# Patient Record
Sex: FEMALE | Race: BLACK OR AFRICAN AMERICAN | NOT HISPANIC OR LATINO | Employment: FULL TIME | URBAN - METROPOLITAN AREA
[De-identification: names, ages, dates, MRNs, and addresses within clinical notes are randomized per-mention and may not be internally consistent; named-entity substitution may affect disease eponyms.]

---

## 2017-02-14 ENCOUNTER — OFFICE VISIT (OUTPATIENT)
Dept: OBSTETRICS AND GYNECOLOGY | Facility: CLINIC | Age: 47
End: 2017-02-14
Payer: COMMERCIAL

## 2017-02-14 VITALS
DIASTOLIC BLOOD PRESSURE: 90 MMHG | HEIGHT: 63 IN | SYSTOLIC BLOOD PRESSURE: 132 MMHG | BODY MASS INDEX: 36.68 KG/M2 | WEIGHT: 207 LBS

## 2017-02-14 DIAGNOSIS — E03.9 ACQUIRED HYPOTHYROIDISM: ICD-10-CM

## 2017-02-14 DIAGNOSIS — Z12.31 SCREENING MAMMOGRAM, ENCOUNTER FOR: ICD-10-CM

## 2017-02-14 DIAGNOSIS — F32.9 REACTIVE DEPRESSION: ICD-10-CM

## 2017-02-14 DIAGNOSIS — I10 ESSENTIAL HYPERTENSION: ICD-10-CM

## 2017-02-14 DIAGNOSIS — B96.89 BACTERIAL VAGINOSIS: ICD-10-CM

## 2017-02-14 DIAGNOSIS — Z01.419 ENCOUNTER FOR GYNECOLOGICAL EXAMINATION (GENERAL) (ROUTINE) WITHOUT ABNORMAL FINDINGS: Primary | ICD-10-CM

## 2017-02-14 DIAGNOSIS — N76.0 BACTERIAL VAGINOSIS: ICD-10-CM

## 2017-02-14 PROCEDURE — 3080F DIAST BP >= 90 MM HG: CPT | Mod: S$GLB,,, | Performed by: OBSTETRICS & GYNECOLOGY

## 2017-02-14 PROCEDURE — 3075F SYST BP GE 130 - 139MM HG: CPT | Mod: S$GLB,,, | Performed by: OBSTETRICS & GYNECOLOGY

## 2017-02-14 PROCEDURE — 87480 CANDIDA DNA DIR PROBE: CPT

## 2017-02-14 PROCEDURE — 99999 PR PBB SHADOW E&M-NEW PATIENT-LVL III: CPT | Mod: PBBFAC,,, | Performed by: OBSTETRICS & GYNECOLOGY

## 2017-02-14 PROCEDURE — 99386 PREV VISIT NEW AGE 40-64: CPT | Mod: S$GLB,,, | Performed by: OBSTETRICS & GYNECOLOGY

## 2017-02-14 RX ORDER — LOSARTAN POTASSIUM 100 MG/1
100 TABLET ORAL
COMMUNITY
Start: 2016-10-24 | End: 2018-03-07

## 2017-02-14 RX ORDER — LEVOTHYROXINE SODIUM 50 UG/1
50 TABLET ORAL
COMMUNITY
Start: 2016-10-24 | End: 2018-03-07 | Stop reason: CLARIF

## 2017-02-14 RX ORDER — ESCITALOPRAM OXALATE 20 MG/1
20 TABLET ORAL
COMMUNITY
Start: 2016-12-27 | End: 2018-06-13 | Stop reason: ALTCHOICE

## 2017-02-14 RX ORDER — FLUTICASONE PROPIONATE 50 MCG
1 SPRAY, SUSPENSION (ML) NASAL
COMMUNITY
Start: 2017-02-01

## 2017-02-14 RX ORDER — HYDROCHLOROTHIAZIDE 25 MG/1
TABLET ORAL
COMMUNITY
Start: 2016-10-24 | End: 2018-09-13 | Stop reason: DRUGHIGH

## 2017-02-14 NOTE — PROGRESS NOTES
"Subjective:       Patient ID: Janis Brar is a 46 y.o. female.    Chief Complaint:  Annual Exam      History of Present Illness  HPI  Annual Exam-Postmenopausal  Patient presents for annual exam. The patient reports multiple complaints--hot flushes, night sweats, weight gain; decreased libido. The patient is sexually active--also vaginal dryness and minimal sensation in the vagina. GYN screening history: last pap: was normal and patient does not recall when last pap was. The patient is not taking hormone replacement therapy. Used estratest in the past--but stopped secondary to her "cancer concerns".  Patient denies post-menopausal vaginal bleeding. The patient wears seatbelts: yes. The patient participates in regular exercise: yes--walks 2.5 miles/d; gym. Has the patient ever been transfused or tattooed?: no. The patient reports that there is not domestic violence in her life.      Had tvh for menorrhagia/adenomyosis; still has ovaires    Denies leak of urine; has occas urgency  mammo at [facility] in October--had bx on lt breast   Also breast mri in October--has follow up planned in 6 months        GYN & OB History  No LMP recorded. Patient has had a hysterectomy.   Date of Last Pap: No result found    OB History    Para Term  AB SAB TAB Ectopic Multiple Living   4 2 2  2 2    2      # Outcome Date GA Lbr Walter/2nd Weight Sex Delivery Anes PTL Lv   4 Term 96    F Vag-Spont   Y   3 Term 03/15/94    M Vag-Spont   Y   2 SAB            1 SAB                   Review of Systems  Review of Systems   Constitutional: Negative for activity change, appetite change, chills, diaphoresis, fatigue, fever and unexpected weight change.   HENT: Negative for mouth sores and tinnitus.    Eyes: Negative for discharge and visual disturbance.   Respiratory: Negative for cough, shortness of breath and wheezing.    Cardiovascular: Negative for chest pain, palpitations and leg swelling.   Gastrointestinal: " Negative for abdominal pain, bloating, blood in stool, constipation, diarrhea, nausea and vomiting.   Endocrine: Positive for hot flashes and hypothyroidism. Negative for diabetes, hair loss and hyperthyroidism.   Genitourinary: Positive for decreased libido and dyspareunia. Negative for dysuria, flank pain, frequency, genital sores, hematuria, menorrhagia, menstrual problem, pelvic pain, urgency, vaginal bleeding, vaginal discharge, vaginal pain, dysmenorrhea, urinary incontinence, postcoital bleeding, postmenopausal bleeding and vaginal odor.   Musculoskeletal: Negative for back pain and myalgias.   Skin:  Negative for rash, no acne and hair changes.   Neurological: Negative for seizures, syncope, numbness and headaches.   Hematological: Negative for adenopathy. Does not bruise/bleed easily.   Psychiatric/Behavioral: Negative for depression and sleep disturbance. The patient is not nervous/anxious.    Breast: Negative for breast mass, breast pain, nipple discharge and skin changes          Objective:    Physical Exam:   Constitutional: She appears well-developed.     Eyes: Conjunctivae and EOM are normal. Pupils are equal, round, and reactive to light.    Neck: Normal range of motion. Neck supple.     Pulmonary/Chest: Effort normal. Right breast exhibits no mass, no nipple discharge, no skin change and no tenderness. Left breast exhibits no mass, no nipple discharge, no skin change and no tenderness. Breasts are symmetrical.            Abdominal: Soft.     Genitourinary: Rectum normal, vagina normal and uterus normal. Pelvic exam was performed with patient supine. Cervix is normal. Right adnexum displays no mass and no tenderness. Left adnexum displays no mass and no tenderness. No erythema, bleeding, rectocele, cystocele or unspecified prolapse of vaginal walls in the vagina. No vaginal discharge found. Labial bartholins normal.       Uterus Size: 6 cm   Musculoskeletal: Normal range of motion.        Neurological: She is alert.    Skin: Skin is warm.    Psychiatric: She has a normal mood and affect.          Assessment:        1. Encounter for gynecological examination (general) (routine) without abnormal findings    2. Screening mammogram, encounter for    3. Screening for cervical cancer               Plan:      Continue annual well woman exam.  A full discussion of the benefit-risk ratio of hormonal replacement therapy was carried out. Improvement in vasomotor and other climacteric symptoms is discussed, including possible improvements in sleep and mood. Reduction of risk for osteoporosis was explained. We discussed the study data showing increased risk of thrombo-embolic events such as myocardial infarction, stroke Pt was made aware of recent study showing lower breast cancer risk in estrogen only users compared to control group (no ert or hrt) .  We also discussed ACOG's recommendation to use hormone replacement therapy for the relief of hot flashes alone and to be on the lowest dose possible for the shortest amount of time.  Alternative such as herbal and soy-based products were reviewed. All of her questions about this therapy were answered.   wishes to proceed with trial of estratest  Advised also use dim  Will get her previous labs for review  Also advise vaginal estrogen at this point; may be able to stop vaginal estrogen in 2-3 months  Continue diet, exercise  Pt aware pap not indicated due to history of tvh and nml pap hx

## 2017-02-15 PROBLEM — E03.9 ACQUIRED HYPOTHYROIDISM: Status: ACTIVE | Noted: 2017-02-15

## 2017-02-15 PROBLEM — F32.9 REACTIVE DEPRESSION: Status: ACTIVE | Noted: 2017-02-15

## 2017-02-15 PROBLEM — I10 ESSENTIAL HYPERTENSION: Status: ACTIVE | Noted: 2017-02-15

## 2017-02-16 ENCOUNTER — TELEPHONE (OUTPATIENT)
Dept: OBSTETRICS AND GYNECOLOGY | Facility: CLINIC | Age: 47
End: 2017-02-16

## 2017-02-16 DIAGNOSIS — N95.1 SYMPTOMATIC MENOPAUSAL OR FEMALE CLIMACTERIC STATES: Primary | ICD-10-CM

## 2017-02-16 LAB
CANDIDA RRNA VAG QL PROBE: NEGATIVE
G VAGINALIS RRNA GENITAL QL PROBE: NEGATIVE
T VAGINALIS RRNA GENITAL QL PROBE: NEGATIVE

## 2017-02-16 RX ORDER — ESTRADIOL 10 UG/1
10 INSERT VAGINAL DAILY
Qty: 14 TABLET | Refills: 0 | Status: SHIPPED | OUTPATIENT
Start: 2017-02-16 | End: 2018-03-07

## 2017-02-16 RX ORDER — ESTERIFIED ESTROGEN AND METHYLTESTOSTERONE 1.25; 2.5 MG/1; MG/1
1 TABLET ORAL DAILY
Qty: 30 TABLET | Refills: 5 | Status: SHIPPED | OUTPATIENT
Start: 2017-02-16 | End: 2018-03-07

## 2017-02-16 NOTE — TELEPHONE ENCOUNTER
rx sent for estratest; and vagifem  After finishing 14 days of vagifem, please call for new rx only taking 2x/wk

## 2017-02-16 NOTE — TELEPHONE ENCOUNTER
----- Message from Kristina Gooden sent at 2/16/2017 11:07 AM CST -----  Contact: pt  States she was seen on Tuesday and her prescription hasn't been called in. Please call pt at [phone]. Thank you

## 2017-02-21 ENCOUNTER — TELEPHONE (OUTPATIENT)
Dept: OBSTETRICS AND GYNECOLOGY | Facility: CLINIC | Age: 47
End: 2017-02-21

## 2017-02-21 NOTE — TELEPHONE ENCOUNTER
----- Message from Vira Landeros sent at 2/20/2017  4:29 PM CST -----  Call pt at [phone]////need to speak with you again//rina segundo

## 2017-03-20 ENCOUNTER — LAB VISIT (OUTPATIENT)
Dept: LAB | Facility: HOSPITAL | Age: 47
End: 2017-03-20
Attending: INTERNAL MEDICINE
Payer: COMMERCIAL

## 2017-03-20 ENCOUNTER — OFFICE VISIT (OUTPATIENT)
Dept: INTERNAL MEDICINE | Facility: CLINIC | Age: 47
End: 2017-03-20
Payer: COMMERCIAL

## 2017-03-20 VITALS
HEIGHT: 63 IN | RESPIRATION RATE: 18 BRPM | BODY MASS INDEX: 36.17 KG/M2 | DIASTOLIC BLOOD PRESSURE: 78 MMHG | HEART RATE: 88 BPM | SYSTOLIC BLOOD PRESSURE: 126 MMHG | OXYGEN SATURATION: 98 % | WEIGHT: 204.13 LBS | TEMPERATURE: 98 F

## 2017-03-20 DIAGNOSIS — E86.0 DEHYDRATION: ICD-10-CM

## 2017-03-20 DIAGNOSIS — E86.0 DEHYDRATION: Primary | ICD-10-CM

## 2017-03-20 LAB
ANION GAP SERPL CALC-SCNC: 11 MMOL/L
BASOPHILS # BLD AUTO: 0.01 K/UL
BASOPHILS NFR BLD: 0.1 %
BUN SERPL-MCNC: 9 MG/DL
CALCIUM SERPL-MCNC: 9.2 MG/DL
CHLORIDE SERPL-SCNC: 102 MMOL/L
CO2 SERPL-SCNC: 28 MMOL/L
CREAT SERPL-MCNC: 0.8 MG/DL
DIFFERENTIAL METHOD: ABNORMAL
EOSINOPHIL # BLD AUTO: 0.2 K/UL
EOSINOPHIL NFR BLD: 2.7 %
ERYTHROCYTE [DISTWIDTH] IN BLOOD BY AUTOMATED COUNT: 13.6 %
EST. GFR  (AFRICAN AMERICAN): >60 ML/MIN/1.73 M^2
EST. GFR  (NON AFRICAN AMERICAN): >60 ML/MIN/1.73 M^2
GLUCOSE SERPL-MCNC: 92 MG/DL
HCT VFR BLD AUTO: 41.2 %
HGB BLD-MCNC: 13.1 G/DL
LYMPHOCYTES # BLD AUTO: 3 K/UL
LYMPHOCYTES NFR BLD: 40.4 %
MCH RBC QN AUTO: 28.8 PG
MCHC RBC AUTO-ENTMCNC: 31.8 %
MCV RBC AUTO: 91 FL
MONOCYTES # BLD AUTO: 0.9 K/UL
MONOCYTES NFR BLD: 12.1 %
NEUTROPHILS # BLD AUTO: 3.3 K/UL
NEUTROPHILS NFR BLD: 44.6 %
PLATELET # BLD AUTO: 237 K/UL
PMV BLD AUTO: 10.7 FL
POTASSIUM SERPL-SCNC: 3.3 MMOL/L
RBC # BLD AUTO: 4.55 M/UL
SODIUM SERPL-SCNC: 141 MMOL/L
WBC # BLD AUTO: 7.3 K/UL

## 2017-03-20 PROCEDURE — 1160F RVW MEDS BY RX/DR IN RCRD: CPT | Mod: S$GLB,,, | Performed by: INTERNAL MEDICINE

## 2017-03-20 PROCEDURE — 80048 BASIC METABOLIC PNL TOTAL CA: CPT

## 2017-03-20 PROCEDURE — 99999 PR PBB SHADOW E&M-EST. PATIENT-LVL III: CPT | Mod: PBBFAC,,, | Performed by: INTERNAL MEDICINE

## 2017-03-20 PROCEDURE — 99213 OFFICE O/P EST LOW 20 MIN: CPT | Mod: S$GLB,,, | Performed by: INTERNAL MEDICINE

## 2017-03-20 PROCEDURE — 3074F SYST BP LT 130 MM HG: CPT | Mod: S$GLB,,, | Performed by: INTERNAL MEDICINE

## 2017-03-20 PROCEDURE — 3078F DIAST BP <80 MM HG: CPT | Mod: S$GLB,,, | Performed by: INTERNAL MEDICINE

## 2017-03-20 PROCEDURE — 85025 COMPLETE CBC W/AUTO DIFF WBC: CPT

## 2017-03-20 PROCEDURE — 36415 COLL VENOUS BLD VENIPUNCTURE: CPT | Mod: PO

## 2017-03-20 RX ORDER — SODIUM CHLORIDE FOR INHALATION 0.9 %
3 VIAL, NEBULIZER (ML) INHALATION
Status: DISCONTINUED | OUTPATIENT
Start: 2017-03-20 | End: 2019-05-01

## 2017-03-20 RX ADMIN — Medication 3 ML: at 04:03

## 2017-03-20 NOTE — MR AVS SNAPSHOT
[facility]  [address] [place] [address] [address]  [place] [address]  Phone: [phone]                  Janis Brar   3/20/2017 3:20 PM   Office Visit    Description:  Female : 1970   Provider:  Chito Villalta MD   Department:  [facility]           Reason for Visit     Diarrhea     Abdominal Pain           Diagnoses this Visit        Comments    Dehydration    -  Primary            To Do List           Goals (5 Years of Data)     None      Follow-Up and Disposition     Return if symptoms worsen or fail to improve.    Follow-up and Disposition History      [facility]     [facility] On Call Nurse Care Line -  Assistance  Registered nurses in the [facility] provide clinical advisement, health education, appointment booking, and other advisory services.  Call for this free service at [phone].             Medications           Message regarding Medications     Verify the changes and/or additions to your medication regime listed below are the same as discussed with your clinician today.  If any of these changes or additions are incorrect, please notify your healthcare provider.        These medications were administered today        Dose Freq    sodium chloride 0.9% nebulizer solution 3 mL 3 mL As needed (PRN)    Sig: Take 3 mLs by nebulization as needed for Wheezing.    Class: Normal    Route: Nebulization           Verify that the below list of medications is an accurate representation of the medications you are currently taking.  If none reported, the list may be blank. If incorrect, please contact your healthcare provider. Carry this list with you in case of emergency.           Current Medications     escitalopram oxalate (LEXAPRO) 20 MG tablet Take 20 mg by mouth.    fluticasone (FLONASE) 50 mcg/actuation nasal spray 1 spray.    hydrochlorothiazide (HYDRODIURIL) 25 MG tablet Take 1tb daily    levothyroxine (SYNTHROID) 50 MCG tablet Take  "50 mcg by mouth.    losartan (COZAAR) 100 MG tablet Take 100 mg by mouth.    estradiol (VAGIFEM) 10 mcg Tab Place 1 tablet (10 mcg total) vaginally once daily.    estrogens,conjugated,-methyltestosterone 1.25-2.5mg (ESTRATEST) 1.25-2.5 mg per tablet Take 1 tablet by mouth once daily.           Clinical Reference Information           Your Vitals Were     BP Pulse Temp Resp    126/78 (BP Location: Right arm, Patient Position: Sitting, BP Method: Manual) 88 98.2 °F (36.8 °C) (Tympanic) 18    Height Weight SpO2 BMI    5' 3" (1.6 m) 92.6 kg (204 lb 2.3 oz) 98% 36.16 kg/m2      Blood Pressure          Most Recent Value    BP  126/78      Allergies as of 3/20/2017     Amlodipine    Fosinopril      Immunizations Administered on Date of Encounter - 3/20/2017     None      Orders Placed During Today's Visit     Future Labs/Procedures Expected by Expires    Basic metabolic panel  3/20/2017 5/19/2018    CBC auto differential  3/20/2017 5/19/2018      [facility] Sign-Up     Activating your [facility] account is as easy as 1-2-3!     1) Visit [email or web address], select Sign Up Now, enter this activation code and your date of birth, then select Next.  [email or web address]  Expires: 5/4/2017  4:37 PM      2) Create a username and password to use when you visit [facility] in the future and select a security question in case you lose your password and select Next.    3) Enter your e-mail address and click Sign Up!    Additional Information  If you have questions, please e-mail [email or web address] or call [phone] to talk to our [facility] staff. Remember, [facility] is NOT to be used for urgent needs. For medical emergencies, dial 911.         Language Assistance Services     ATTENTION: Language assistance services are available, free of charge. Please call [phone].      ATENCIÓN: Si habla español, tiene a mcgowan disposición servicios gratuitos de asistencia lingüística. Llame al [phone].     SARAH Ý: N?u b?n nói Ti?ng Vi?t, có " các d?ch v? h? tr? ngôn ng? mi?n phí dành cho b?n. G?i s? [phone].         [facility] complies with applicable Federal civil rights laws and does not discriminate on the basis of race, color, national origin, age, disability, or sex.

## 2017-03-20 NOTE — PROGRESS NOTES
Subjective:      Patient ID: Janis Brar is a 46 y.o. female.    Chief Complaint: Diarrhea and Abdominal Pain    HPI  Ms. Brar is a patient of Sandra Arceo MD, who presents for diarrhea and abdominal pain. She reports her diarrhea started after Friday night dinner. She noted temperature to 101 F and nausea with 4 episodes of vomiting on Saturday and Sunday. 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 She endorses eating fried fish and tuna at a recent conference on Friday. No international travel. She reports fatigue and decreased appetite. She reports having intermittent epigastric and lower abdominal pain.     Past Medical History:   Diagnosis Date    Asthma     Hypertension      Past Surgical History:   Procedure Laterality Date    HYSTERECTOMY       Social History     Social History    Marital status:      Spouse name: N/A    Number of children: N/A    Years of education: N/A     Occupational History    Not on file.     Social History Main Topics    Smoking status: Never Smoker    Smokeless tobacco: Not on file    Alcohol use Yes      Comment: ocasionally     Drug use: No    Sexual activity: Yes     Partners: Male     Other Topics Concern    Not on file     Social History Narrative     Family History   Problem Relation Age of Onset    Hypertension Father     Hypertension Mother     Hypothyroidism Mother        Current Outpatient Prescriptions:     escitalopram oxalate (LEXAPRO) 20 MG tablet, Take 20 mg by mouth., Disp: , Rfl:     fluticasone (FLONASE) 50 mcg/actuation nasal spray, 1 spray., Disp: , Rfl:     hydrochlorothiazide (HYDRODIURIL) 25 MG tablet, Take 1tb daily, Disp: , Rfl:     levothyroxine (SYNTHROID) 50 MCG tablet, Take 50 mcg by mouth., Disp: , Rfl:     losartan (COZAAR) 100 MG tablet, Take 100 mg by mouth., Disp: , Rfl:     estradiol (VAGIFEM) 10 mcg Tab, Place 1 tablet (10 mcg total) vaginally once daily., Disp: 14 tablet, Rfl: 0     "estrogens,conjugated,-methyltestosterone 1.25-2.5mg (ESTRATEST) 1.25-2.5 mg per tablet, Take 1 tablet by mouth once daily., Disp: 30 tablet, Rfl: 5    Current Facility-Administered Medications:     sodium chloride 0.9% nebulizer solution 3 mL, 3 mL, Nebulization, PRN, Chito Villalta MD    Review of patient's allergies indicates:   Allergen Reactions    Amlodipine      Weight gain and constipation    Fosinopril Other (See Comments)     Cough     No results found for: WBC, HGB, HCT, PLT, CHOL, TRIG, HDL, LDLDIRECT, ALT, AST, NA, K, CL, CREATININE, BUN, CO2, TSH, PSA, INR, GLUF, HGBA1C, MICROALBUR    /78 (BP Location: Right arm, Patient Position: Sitting, BP Method: Manual)  Pulse 88  Temp 98.2 °F (36.8 °C) (Tympanic)   Resp 18  Ht 5' 3" (1.6 m)  Wt 92.6 kg (204 lb 2.3 oz)  SpO2 98%  BMI 36.16 kg/m2    Review of Systems   Negative    Objective:     Physical Exam  GEN: A&O fully, NAD  PSYC: Normal affect  HEENT: OP: Clear, no LAD, no thyroid masses; +dry MM  CV: RRR, no M/G/R  PULM: CTA bilaterally, no wheezes, rales  GI: S/NT/ND, normal bowel sounds  EXT: No C/C/E  NEURO: CN II-XII intact, 5/5 strength globally, no sensory losses, normal tandem gait, normal Romberg, 2+ DTRs globally  DERM: +skin tenting    No results found for: HGBA1C    Assessment:      1. Dehydration: Likely 2/2 to viral gastroenteritis. DDx includes food poisoning, which is less likely given h/o fever. Will hold on stool studies at this time given absence of travel/outdoor activities. Will tx with 1L IV NS and she understands to increase her water/Powerade intake to 100-120 oz/day.      Plan:   Dehydration  -     Basic metabolic panel; Future; Expected date: 3/20/17  -     CBC auto differential; Future; Expected date: 3/20/17    Other orders  -     sodium chloride 0.9% nebulizer solution 3 mL; Take 3 mLs by nebulization as needed for Wheezing.    RTC prn  "

## 2017-03-22 ENCOUNTER — TELEPHONE (OUTPATIENT)
Dept: INTERNAL MEDICINE | Facility: CLINIC | Age: 47
End: 2017-03-22

## 2017-03-22 NOTE — TELEPHONE ENCOUNTER
----- Message from Noreen Brower sent at 3/22/2017  2:43 PM CDT -----  Contact: pt  Pt calling for lab results....[phone]

## 2017-03-22 NOTE — TELEPHONE ENCOUNTER
Spoke with pt and informed her of lab work and also about letter being mailed out with labs results in acceptable ranges. Pt voiced understanding

## 2017-09-29 ENCOUNTER — TELEPHONE (OUTPATIENT)
Dept: OBSTETRICS AND GYNECOLOGY | Facility: CLINIC | Age: 47
End: 2017-09-29

## 2017-09-29 NOTE — TELEPHONE ENCOUNTER
Left messages for the pt. to call back. prasanth qiu    Pt stated that she has a question regarding hormone treatment.  She can be reached at [phone]

## 2017-10-30 ENCOUNTER — CLINICAL SUPPORT (OUTPATIENT)
Dept: OTHER | Facility: CLINIC | Age: 47
End: 2017-10-30
Payer: COMMERCIAL

## 2017-10-30 DIAGNOSIS — Z00.8 HEALTH EXAMINATION IN POPULATION SURVEYS: Primary | ICD-10-CM

## 2017-10-30 PROCEDURE — 80061 LIPID PANEL: CPT | Mod: QW,S$GLB,, | Performed by: INTERNAL MEDICINE

## 2017-10-30 PROCEDURE — 99401 PREV MED CNSL INDIV APPRX 15: CPT | Mod: S$GLB,,, | Performed by: INTERNAL MEDICINE

## 2017-10-30 PROCEDURE — 82947 ASSAY GLUCOSE BLOOD QUANT: CPT | Mod: QW,S$GLB,, | Performed by: INTERNAL MEDICINE

## 2017-10-31 VITALS
WEIGHT: 202 LBS | BODY MASS INDEX: 35.79 KG/M2 | DIASTOLIC BLOOD PRESSURE: 91 MMHG | SYSTOLIC BLOOD PRESSURE: 144 MMHG | HEIGHT: 63 IN

## 2017-10-31 LAB
GLUCOSE SERPL-MCNC: 84 MG/DL (ref 60–140)
POC CHOLESTEROL, HDL: 38 MG/DL (ref 40–?)
POC CHOLESTEROL, LDL: ABNORMAL MG/DL (ref ?–160)
POC CHOLESTEROL, TOTAL: 151 MG/DL (ref ?–240)
POC GLUCOSE FASTING: ABNORMAL MG/DL (ref 60–110)
POC TOTAL CHOLESTEROL / HDL RATIO: 3.97 (ref ?–6)
POC TRIGLYCERIDES: 408 MG/DL (ref ?–160)

## 2018-03-07 ENCOUNTER — OFFICE VISIT (OUTPATIENT)
Dept: URGENT CARE | Facility: CLINIC | Age: 48
End: 2018-03-07
Payer: COMMERCIAL

## 2018-03-07 VITALS
TEMPERATURE: 98 F | WEIGHT: 207.88 LBS | BODY MASS INDEX: 36.83 KG/M2 | HEART RATE: 86 BPM | HEIGHT: 63 IN | DIASTOLIC BLOOD PRESSURE: 81 MMHG | SYSTOLIC BLOOD PRESSURE: 141 MMHG

## 2018-03-07 DIAGNOSIS — J45.909 ASTHMA, UNSPECIFIED ASTHMA SEVERITY, UNSPECIFIED WHETHER COMPLICATED, UNSPECIFIED WHETHER PERSISTENT: Primary | ICD-10-CM

## 2018-03-07 DIAGNOSIS — J06.9 UPPER RESPIRATORY TRACT INFECTION, UNSPECIFIED TYPE: ICD-10-CM

## 2018-03-07 PROBLEM — G47.33 OSA ON CPAP: Status: ACTIVE | Noted: 2017-04-22

## 2018-03-07 PROBLEM — F41.1 GENERALIZED ANXIETY DISORDER: Status: ACTIVE | Noted: 2018-03-07

## 2018-03-07 PROCEDURE — 3079F DIAST BP 80-89 MM HG: CPT | Mod: S$GLB,,, | Performed by: NURSE PRACTITIONER

## 2018-03-07 PROCEDURE — 3077F SYST BP >= 140 MM HG: CPT | Mod: S$GLB,,, | Performed by: NURSE PRACTITIONER

## 2018-03-07 PROCEDURE — 99999 PR PBB SHADOW E&M-EST. PATIENT-LVL III: CPT | Mod: PBBFAC,,, | Performed by: NURSE PRACTITIONER

## 2018-03-07 PROCEDURE — 99214 OFFICE O/P EST MOD 30 MIN: CPT | Mod: S$GLB,,, | Performed by: NURSE PRACTITIONER

## 2018-03-07 RX ORDER — PREDNISONE 20 MG/1
20 TABLET ORAL DAILY
Qty: 5 TABLET | Refills: 0 | Status: SHIPPED | OUTPATIENT
Start: 2018-03-07 | End: 2018-03-12

## 2018-03-07 RX ORDER — DESONIDE 0.5 MG/ML
LOTION TOPICAL
COMMUNITY
Start: 2017-11-22

## 2018-03-07 RX ORDER — LOSARTAN POTASSIUM 100 MG/1
100 TABLET ORAL
COMMUNITY
Start: 2017-11-10 | End: 2019-04-16

## 2018-03-07 RX ORDER — PROMETHAZINE HYDROCHLORIDE AND DEXTROMETHORPHAN HYDROBROMIDE 6.25; 15 MG/5ML; MG/5ML
5 SYRUP ORAL NIGHTLY PRN
Qty: 120 ML | Refills: 0 | Status: SHIPPED | OUTPATIENT
Start: 2018-03-07 | End: 2019-04-16

## 2018-03-07 RX ORDER — THYROID 60 MG/1
60 TABLET ORAL
COMMUNITY
Start: 2018-02-06 | End: 2019-06-19 | Stop reason: SDUPTHER

## 2018-03-07 RX ORDER — IPRATROPIUM BROMIDE 21 UG/1
SPRAY, METERED NASAL
COMMUNITY
Start: 2018-02-19 | End: 2020-02-04

## 2018-03-07 RX ORDER — MONTELUKAST SODIUM 10 MG/1
10 TABLET ORAL NIGHTLY
Qty: 30 TABLET | Refills: 0 | Status: SHIPPED | OUTPATIENT
Start: 2018-03-07 | End: 2018-03-07 | Stop reason: SDUPTHER

## 2018-03-07 RX ORDER — KETOCONAZOLE 20 MG/ML
SHAMPOO, SUSPENSION TOPICAL
COMMUNITY
Start: 2017-11-22 | End: 2019-06-18

## 2018-03-07 RX ORDER — DOXYCYCLINE 100 MG/1
100 CAPSULE ORAL EVERY 12 HOURS
Qty: 14 CAPSULE | Refills: 0 | Status: SHIPPED | OUTPATIENT
Start: 2018-03-07 | End: 2018-03-14

## 2018-03-07 RX ORDER — FELODIPINE 10 MG/1
10 TABLET, EXTENDED RELEASE ORAL
COMMUNITY
Start: 2018-02-12 | End: 2018-08-27 | Stop reason: SDUPTHER

## 2018-03-07 RX ORDER — ALBUTEROL SULFATE 90 UG/1
1-2 AEROSOL, METERED RESPIRATORY (INHALATION) EVERY 6 HOURS PRN
Qty: 1 INHALER | Refills: 0 | Status: SHIPPED | OUTPATIENT
Start: 2018-03-07

## 2018-03-07 RX ORDER — MONTELUKAST SODIUM 10 MG/1
10 TABLET ORAL NIGHTLY
Qty: 30 TABLET | Refills: 0 | Status: SHIPPED | OUTPATIENT
Start: 2018-03-07 | End: 2018-04-06

## 2018-03-07 RX ORDER — ESTRADIOL 10 UG/1
INSERT VAGINAL
COMMUNITY
Start: 2017-11-13 | End: 2018-09-13

## 2018-03-08 NOTE — PATIENT INSTRUCTIONS
Viral Upper Respiratory Illness (Adult)  You have a viral upper respiratory illness (URI), which is another term for the common cold. This illness is contagious during the first few days. It is spread through the air by coughing and sneezing. It may also be spread by direct contact (touching the sick person and then touching your own eyes, nose, or mouth). Frequent handwashing will decrease risk of spread. Most viral illnesses go away within 7 to 10 days with rest and simple home remedies. Sometimes the illness may last for several weeks. Antibiotics will not kill a virus, and they are generally not prescribed for this condition.    Home care  · If symptoms are severe, rest at home for the first 2 to 3 days. When you resume activity, don't let yourself get too tired.  · Avoid being exposed to cigarette smoke (yours or others).  · You may use acetaminophen or ibuprofen to control pain and fever, unless another medicine was prescribed. (Note: If you have chronic liver or kidney disease, have ever had a stomach ulcer or gastrointestinal bleeding, or are taking blood-thinning medicines, talk with your healthcare provider before using these medicines.) Aspirin should never be given to anyone under 18 years of age who is ill with a viral infection or fever. It may cause severe liver or brain damage.  · Your appetite may be poor, so a light diet is fine. Avoid dehydration by drinking 6 to 8 glasses of fluids per day (water, soft drinks, juices, tea, or soup). Extra fluids will help loosen secretions in the nose and lungs.  · Over-the-counter cold medicines will not shorten the length of time youre sick, but they may be helpful for the following symptoms: cough, sore throat, and nasal and sinus congestion. (Note: Do not use decongestants if you have high blood pressure.)  Follow-up care  Follow up with your healthcare provider, or as advised.  When to seek medical advice  Call your healthcare provider right away  if any of these occur:  · Cough with lots of colored sputum (mucus)  · Severe headache; face, neck, or ear pain  · Difficulty swallowing due to throat pain  · Fever of 100.4°F (38°C)  Call 911, or get immediate medical care  Call emergency services right away if any of these occur:  · Chest pain, shortness of breath, wheezing, or difficulty breathing  · Coughing up blood  · Inability to swallow due to throat pain  Date Last Reviewed: 9/13/2015 © 2000-2017 [employer]. [address] [place] [address]. All rights reserved. This information is not intended as a substitute for professional medical care. Always follow your healthcare professional's instructions.        Understanding Asthma    Asthma causes swelling and narrowing of the airways in your lungs. Medical experts are not exactly sure what causes asthma. It is believed to be caused by a mix of inherited and environmental factors.  Healthy lungs  Inside the lungs there are branching airways made of stretchy tissue. Each airway is wrapped with bands of muscle. The airways become more narrow as they go deeper into the lungs. The smallest airways end in clusters of tiny balloon-like air sacs (alveoli). These clusters are surrounded by blood vessels. When you breathe in (inhale), air enters the lungs. It travels down through the airways until it reaches the air sacs. When you breathe out (exhale), air travels up through the airways and out of the lungs. The airways produce mucus that traps particles you breathe in. Normally, the mucus is then swept out of the lungs by tiny hairs (cilia) that line the airways. The mucus is swallowed or coughed up.  What the lungs do  The air you inhale contains oxygen. When oxygen reaches the air sacs, it passes into the blood vessels surrounding the sacs. Your blood then delivers oxygen to all of your cells. As you exhale, carbon dioxide is removed in a similar way from the blood in the air sacs, and from  the body.  When you have asthma  People with asthma have very sensitive airways. This means the airways react to certain things called triggers (such as pollen, dust, or smoke) and become swollen and narrowed. Inflammation makes the airways swollen and narrowed. This is a long-lasting (chronic) problem. The airways may not always be narrowed enough to notice breathing problems.  Symptoms of chronic inflammation:   · Coughing  · Chest tightness  · Shortness of breath  · Wheezing (a whistling noise, especially when breathing out)  · Low energy or feeling tired  In some people, over time chronic mild inflammation can lead to lasting (permanent) scarring of airways and loss of lung function.  Moderate flare-ups  When sensitive airways are irritated by a trigger, the muscles around the airways tighten. The lining of the airways swells. Thick, sticky mucus increases and partly clogs the airways. All of this makes you work harder to keep breathing.  Symptoms of moderate flare-ups:  · Coughing, especially at night  · Getting tired or out of breath easily  · Wheezing  · Chest tightness  · Faster breathing when at rest  Severe flare-ups  Severe flare-ups are life-threatening. In a severe flare-up, the muscle tightening, swelling, and mucus production are even worse. Its very hard to breathe. Your body can't get enough oxygen and can't remove carbon dioxide. Waste gas is trapped in the alveoli, and gas exchange cant occur. The body is not getting enough oxygen. Without oxygen, body tissues, especially brain tissue, begin to get damaged. If this goes on for long, it can lead to severe brain damage or death.  Call 911 (or have someone call for you) if you have any of these symptoms and they are not relieved right away by taking your quick-relief medicine as prescribed:  · Severe trouble breathing  · Too short of breath to talk or walk  · Lips or fingers turning blue  · Feeling lightheaded or dizzy, as though you are about to  pass out  · Peak flow less than 50% of your personal best, if you use peak flow monitoring  Asthma is a long-term condition. So its important to work with your healthcare provider to manage it. If you smoke, get help to quit. Know your triggers and figure out how to avoid them. Its also very important to take your medicines as directed. That means taking them even when you feel good.  Date Last Reviewed: 12/1/2016  © 7925-7731 [employer]. [address] [place] [address], [place] [address]. All rights reserved. This information is not intended as a substitute for professional medical care. Always follow your healthcare professional's instructions.

## 2018-03-08 NOTE — PROGRESS NOTES
Subjective:       Patient ID: Janis Brar is a 47 y.o. female.    Chief Complaint: Chest Pain (x 1 week ) and Cough    Cough   This is a new problem. The current episode started in the past 7 days. The cough is non-productive. Associated symptoms include nasal congestion, rhinorrhea and wheezing. Pertinent negatives include no chest pain, chills, ear congestion, ear pain, fever, headaches, hemoptysis, myalgias, postnasal drip, sore throat or shortness of breath. She has tried OTC cough suppressant for the symptoms. The treatment provided mild relief. Her past medical history is significant for asthma.     Review of Systems   Constitutional: Negative for chills, diaphoresis, fatigue and fever.   HENT: Positive for rhinorrhea and sinus pressure. Negative for congestion, ear discharge, ear pain, postnasal drip, sinus pain, sneezing and sore throat.    Respiratory: Positive for cough, chest tightness and wheezing. Negative for hemoptysis and shortness of breath.    Cardiovascular: Negative for chest pain and palpitations.   Musculoskeletal: Negative for back pain and myalgias.   Neurological: Negative for headaches.       Objective:      Physical Exam   Constitutional: She is oriented to person, place, and time. She appears well-developed and well-nourished. No distress.   HENT:   Head: Normocephalic.   Right Ear: Tympanic membrane, external ear and ear canal normal.   Left Ear: Tympanic membrane, external ear and ear canal normal.   Nose: Nose normal. No mucosal edema or rhinorrhea. Right sinus exhibits no maxillary sinus tenderness and no frontal sinus tenderness. Left sinus exhibits no maxillary sinus tenderness and no frontal sinus tenderness.   Mouth/Throat: Uvula is midline, oropharynx is clear and moist and mucous membranes are normal. No oropharyngeal exudate, posterior oropharyngeal edema or posterior oropharyngeal erythema.   Eyes: Conjunctivae and EOM are normal.   Neck: Normal range of motion.  Neck supple.   Cardiovascular: Normal rate, regular rhythm and normal heart sounds.    Pulmonary/Chest: Effort normal and breath sounds normal. No accessory muscle usage. No apnea, no tachypnea and no bradypnea. No respiratory distress. She has no decreased breath sounds. She has no wheezes. She has no rhonchi. She has no rales.   Lymphadenopathy:        Head (right side): No submental, no submandibular and no tonsillar adenopathy present.        Head (left side): No submental, no submandibular and no tonsillar adenopathy present.     She has no cervical adenopathy.   Neurological: She is alert and oriented to person, place, and time.   Skin: Skin is warm and dry. She is not diaphoretic.       Assessment:       1. Asthma, unspecified asthma severity, unspecified whether complicated, unspecified whether persistent    2. Upper respiratory tract infection, unspecified type        Plan:   Janis was seen today for chest pain and cough.    Diagnoses and all orders for this visit:    Asthma, unspecified asthma severity, unspecified whether complicated, unspecified whether persistent  -     albuterol 90 mcg/actuation inhaler; Inhale 1-2 puffs into the lungs every 6 (six) hours as needed for Wheezing (cough).  -     predniSONE (DELTASONE) 20 MG tablet; Take 1 tablet (20 mg total) by mouth once daily.  -     promethazine-dextromethorphan (PROMETHAZINE-DM) 6.25-15 mg/5 mL Syrp; Take 5 mLs by mouth nightly as needed. May cause drowsiness  -     montelukast (SINGULAIR) 10 mg tablet; Take 1 tablet (10 mg total) by mouth every evening.    Upper respiratory tract infection, unspecified type    Other orders  -     doxycycline (VIBRAMYCIN) 100 MG Cap; Take 1 capsule (100 mg total) by mouth every 12 (twelve) hours. Note to Pharmacy: Can substitute for Monodox if needed      -     Diagnosis and treatment discussed, AVS provided  -     Defer abx unless no improvement/worsening as discussed  -     Follow up with PCP or ER immediately  for worsening, new or no improvement of symptoms.   -     Patient understands and agrees with plan

## 2018-05-17 ENCOUNTER — OFFICE VISIT (OUTPATIENT)
Dept: URGENT CARE | Facility: CLINIC | Age: 48
End: 2018-05-17
Payer: COMMERCIAL

## 2018-05-17 VITALS
HEIGHT: 63 IN | OXYGEN SATURATION: 97 % | DIASTOLIC BLOOD PRESSURE: 78 MMHG | RESPIRATION RATE: 16 BRPM | WEIGHT: 213.88 LBS | SYSTOLIC BLOOD PRESSURE: 130 MMHG | HEART RATE: 84 BPM | BODY MASS INDEX: 37.89 KG/M2 | TEMPERATURE: 97 F

## 2018-05-17 DIAGNOSIS — N39.0 URINARY TRACT INFECTION WITHOUT HEMATURIA, SITE UNSPECIFIED: Primary | ICD-10-CM

## 2018-05-17 DIAGNOSIS — M54.9 BACK PAIN, UNSPECIFIED BACK LOCATION, UNSPECIFIED BACK PAIN LATERALITY, UNSPECIFIED CHRONICITY: ICD-10-CM

## 2018-05-17 LAB
BILIRUB SERPL-MCNC: NEGATIVE MG/DL
BLOOD URINE, POC: NEGATIVE
COLOR, POC UA: NORMAL
GLUCOSE UR QL STRIP: NORMAL
KETONES UR QL STRIP: NEGATIVE
LEUKOCYTE ESTERASE URINE, POC: NORMAL
NITRITE, POC UA: NEGATIVE
PH, POC UA: 7
PROTEIN, POC: NORMAL
SPECIFIC GRAVITY, POC UA: 1.01
UROBILINOGEN, POC UA: NORMAL

## 2018-05-17 PROCEDURE — 3075F SYST BP GE 130 - 139MM HG: CPT | Mod: CPTII,S$GLB,, | Performed by: NURSE PRACTITIONER

## 2018-05-17 PROCEDURE — 87086 URINE CULTURE/COLONY COUNT: CPT

## 2018-05-17 PROCEDURE — 99214 OFFICE O/P EST MOD 30 MIN: CPT | Mod: 25,S$GLB,, | Performed by: NURSE PRACTITIONER

## 2018-05-17 PROCEDURE — 3008F BODY MASS INDEX DOCD: CPT | Mod: CPTII,S$GLB,, | Performed by: NURSE PRACTITIONER

## 2018-05-17 PROCEDURE — 99999 PR PBB SHADOW E&M-EST. PATIENT-LVL V: CPT | Mod: PBBFAC,,, | Performed by: NURSE PRACTITIONER

## 2018-05-17 PROCEDURE — 3078F DIAST BP <80 MM HG: CPT | Mod: CPTII,S$GLB,, | Performed by: NURSE PRACTITIONER

## 2018-05-17 PROCEDURE — 81002 URINALYSIS NONAUTO W/O SCOPE: CPT | Mod: S$GLB,,, | Performed by: NURSE PRACTITIONER

## 2018-05-17 RX ORDER — IBUPROFEN 800 MG/1
800 TABLET ORAL 3 TIMES DAILY
Qty: 30 TABLET | Refills: 0 | Status: SHIPPED | OUTPATIENT
Start: 2018-05-17 | End: 2018-05-27

## 2018-05-17 RX ORDER — ORPHENADRINE CITRATE 100 MG/1
100 TABLET, EXTENDED RELEASE ORAL 2 TIMES DAILY
Qty: 20 TABLET | Refills: 0 | Status: SHIPPED | OUTPATIENT
Start: 2018-05-17 | End: 2018-05-27

## 2018-05-17 RX ORDER — SULFAMETHOXAZOLE AND TRIMETHOPRIM 800; 160 MG/1; MG/1
1 TABLET ORAL 2 TIMES DAILY
Qty: 14 TABLET | Refills: 0 | Status: SHIPPED | OUTPATIENT
Start: 2018-05-17 | End: 2018-05-24

## 2018-05-17 NOTE — LETTER
May 17, 2018      [facility]  [address]  [place] [address]  Phone: [phone]       Patient: Janis Brar   YOB: 1970  Date of Visit: 05/17/2018    To Whom It May Concern:    Chris Brar  was at [facility] on 05/17/2018. She may return to work/school on 05/18/2018 with no restrictions. If you have any questions or concerns, or if I can be of further assistance, please do not hesitate to contact me.    Sincerely,      JULIANNE Amaya

## 2018-05-17 NOTE — PATIENT INSTRUCTIONS

## 2018-05-17 NOTE — PROGRESS NOTES
Subjective:       Patient ID: Janis Brar is a 48 y.o. female.    Chief Complaint: Urinary Tract Infection and Flank Pain    Urinary Tract Infection    Associated symptoms include flank pain, frequency and urgency. Pertinent negatives include no chills, nausea, vomiting or rash.   Flank Pain   Pertinent negatives include no abdominal pain, chest pain, dysuria, fever, numbness or weakness.     Review of Systems   Constitutional: Negative for appetite change, chills and fever.   HENT: Negative for ear pain, sinus pressure, sore throat and trouble swallowing.    Eyes: Negative for visual disturbance.   Respiratory: Negative for shortness of breath.    Cardiovascular: Negative for chest pain.   Gastrointestinal: Negative for abdominal pain, diarrhea, nausea and vomiting.   Endocrine: Negative for cold intolerance, polyphagia and polyuria.   Genitourinary: Positive for flank pain, frequency and urgency. Negative for decreased urine volume and dysuria.   Musculoskeletal: Negative for back pain.   Skin: Negative for rash.   Allergic/Immunologic: Negative for environmental allergies and food allergies.   Neurological: Negative for dizziness, tremors, weakness and numbness.   Hematological: Does not bruise/bleed easily.   Psychiatric/Behavioral: Negative for confusion and hallucinations. The patient is not nervous/anxious and is not hyperactive.    All other systems reviewed and are negative.      Objective:     Physical Exam   Constitutional: She is oriented to person, place, and time. She appears well-developed and well-nourished.   HENT:   Head: Normocephalic and atraumatic.   Right Ear: External ear normal.   Left Ear: External ear normal.   Nose: Nose normal.   Mouth/Throat: Oropharynx is clear and moist.   Eyes: Conjunctivae and EOM are normal. Pupils are equal, round, and reactive to light.   Neck: Normal range of motion. Neck supple.   Cardiovascular: Normal rate, regular rhythm, normal heart sounds and  intact distal pulses.    No murmur heard.  Pulmonary/Chest: Effort normal and breath sounds normal. She has no wheezes.   Abdominal: Soft. Bowel sounds are normal. There is no tenderness.   Musculoskeletal: Normal range of motion.   Neurological: She is alert and oriented to person, place, and time. She has normal reflexes.   Skin: Skin is warm and dry. No rash noted.   Psychiatric: She has a normal mood and affect. Her behavior is normal. Judgment and thought content normal.   Nursing note and vitals reviewed.    Assessment:     No diagnosis found.  Plan:   There are no diagnoses linked to this encounter.

## 2018-05-18 LAB
BACTERIA UR CULT: NORMAL
BACTERIA UR CULT: NORMAL

## 2018-06-13 ENCOUNTER — LAB VISIT (OUTPATIENT)
Dept: LAB | Facility: HOSPITAL | Age: 48
End: 2018-06-13
Attending: INTERNAL MEDICINE
Payer: COMMERCIAL

## 2018-06-13 ENCOUNTER — OFFICE VISIT (OUTPATIENT)
Dept: INTERNAL MEDICINE | Facility: CLINIC | Age: 48
End: 2018-06-13
Payer: COMMERCIAL

## 2018-06-13 VITALS
DIASTOLIC BLOOD PRESSURE: 86 MMHG | TEMPERATURE: 98 F | HEART RATE: 93 BPM | BODY MASS INDEX: 37.97 KG/M2 | SYSTOLIC BLOOD PRESSURE: 142 MMHG | HEIGHT: 63 IN | OXYGEN SATURATION: 97 % | WEIGHT: 214.31 LBS

## 2018-06-13 DIAGNOSIS — R73.02 IMPAIRED GLUCOSE TOLERANCE: Primary | ICD-10-CM

## 2018-06-13 DIAGNOSIS — E03.9 ACQUIRED HYPOTHYROIDISM: ICD-10-CM

## 2018-06-13 DIAGNOSIS — R68.89 HEAT INTOLERANCE: ICD-10-CM

## 2018-06-13 DIAGNOSIS — R82.71 BACTERIURIA: ICD-10-CM

## 2018-06-13 DIAGNOSIS — R73.02 IMPAIRED GLUCOSE TOLERANCE: ICD-10-CM

## 2018-06-13 DIAGNOSIS — R35.0 URINARY FREQUENCY: ICD-10-CM

## 2018-06-13 LAB
25(OH)D3+25(OH)D2 SERPL-MCNC: 38 NG/ML
ALBUMIN SERPL BCP-MCNC: 4 G/DL
ALP SERPL-CCNC: 75 U/L
ALT SERPL W/O P-5'-P-CCNC: 20 U/L
ANION GAP SERPL CALC-SCNC: 9 MMOL/L
AST SERPL-CCNC: 19 U/L
BASOPHILS # BLD AUTO: 0.03 K/UL
BASOPHILS NFR BLD: 0.3 %
BILIRUB SERPL-MCNC: 0.6 MG/DL
BILIRUB SERPL-MCNC: NEGATIVE MG/DL
BLOOD URINE, POC: ABNORMAL
BUN SERPL-MCNC: 16 MG/DL
CALCIUM SERPL-MCNC: 9.8 MG/DL
CHLORIDE SERPL-SCNC: 100 MMOL/L
CHOLEST SERPL-MCNC: 189 MG/DL
CHOLEST/HDLC SERPL: 3.9 {RATIO}
CO2 SERPL-SCNC: 32 MMOL/L
COLOR, POC UA: ABNORMAL
CREAT SERPL-MCNC: 0.8 MG/DL
DIFFERENTIAL METHOD: ABNORMAL
EOSINOPHIL # BLD AUTO: 0.3 K/UL
EOSINOPHIL NFR BLD: 2.6 %
ERYTHROCYTE [DISTWIDTH] IN BLOOD BY AUTOMATED COUNT: 12.7 %
EST. GFR  (AFRICAN AMERICAN): >60 ML/MIN/1.73 M^2
EST. GFR  (NON AFRICAN AMERICAN): >60 ML/MIN/1.73 M^2
GLUCOSE SERPL-MCNC: 81 MG/DL
GLUCOSE UR QL STRIP: NORMAL
HCT VFR BLD AUTO: 39.6 %
HDLC SERPL-MCNC: 49 MG/DL
HDLC SERPL: 25.9 %
HGB BLD-MCNC: 12.9 G/DL
IMM GRANULOCYTES # BLD AUTO: 0.03 K/UL
IMM GRANULOCYTES NFR BLD AUTO: 0.3 %
KETONES UR QL STRIP: ABNORMAL
LDLC SERPL CALC-MCNC: 119.8 MG/DL
LEUKOCYTE ESTERASE URINE, POC: ABNORMAL
LYMPHOCYTES # BLD AUTO: 3.5 K/UL
LYMPHOCYTES NFR BLD: 33.9 %
MCH RBC QN AUTO: 29.7 PG
MCHC RBC AUTO-ENTMCNC: 32.6 G/DL
MCV RBC AUTO: 91 FL
MONOCYTES # BLD AUTO: 1.2 K/UL
MONOCYTES NFR BLD: 11.3 %
NEUTROPHILS # BLD AUTO: 5.3 K/UL
NEUTROPHILS NFR BLD: 51.6 %
NITRITE, POC UA: ABNORMAL
NONHDLC SERPL-MCNC: 140 MG/DL
NRBC BLD-RTO: 0 /100 WBC
PH, POC UA: 7
PLATELET # BLD AUTO: 294 K/UL
PMV BLD AUTO: 10.9 FL
POTASSIUM SERPL-SCNC: 3.7 MMOL/L
PROT SERPL-MCNC: 7.3 G/DL
PROTEIN, POC: ABNORMAL
RBC # BLD AUTO: 4.34 M/UL
SODIUM SERPL-SCNC: 141 MMOL/L
SPECIFIC GRAVITY, POC UA: 1.01
T3FREE SERPL-MCNC: 3 PG/ML
T4 FREE SERPL-MCNC: 0.78 NG/DL
TRIGL SERPL-MCNC: 101 MG/DL
TSH SERPL DL<=0.005 MIU/L-ACNC: 0.73 UIU/ML
UROBILINOGEN, POC UA: NORMAL
WBC # BLD AUTO: 10.31 K/UL

## 2018-06-13 PROCEDURE — 82306 VITAMIN D 25 HYDROXY: CPT

## 2018-06-13 PROCEDURE — 81001 URINALYSIS AUTO W/SCOPE: CPT | Mod: S$GLB,,, | Performed by: INTERNAL MEDICINE

## 2018-06-13 PROCEDURE — 36415 COLL VENOUS BLD VENIPUNCTURE: CPT | Mod: PO

## 2018-06-13 PROCEDURE — 3079F DIAST BP 80-89 MM HG: CPT | Mod: CPTII,S$GLB,, | Performed by: INTERNAL MEDICINE

## 2018-06-13 PROCEDURE — 3077F SYST BP >= 140 MM HG: CPT | Mod: CPTII,S$GLB,, | Performed by: INTERNAL MEDICINE

## 2018-06-13 PROCEDURE — 3008F BODY MASS INDEX DOCD: CPT | Mod: CPTII,S$GLB,, | Performed by: INTERNAL MEDICINE

## 2018-06-13 PROCEDURE — 99999 PR PBB SHADOW E&M-EST. PATIENT-LVL III: CPT | Mod: PBBFAC,,, | Performed by: INTERNAL MEDICINE

## 2018-06-13 PROCEDURE — 80061 LIPID PANEL: CPT

## 2018-06-13 PROCEDURE — 85025 COMPLETE CBC W/AUTO DIFF WBC: CPT

## 2018-06-13 PROCEDURE — 84439 ASSAY OF FREE THYROXINE: CPT

## 2018-06-13 PROCEDURE — 84443 ASSAY THYROID STIM HORMONE: CPT

## 2018-06-13 PROCEDURE — 87086 URINE CULTURE/COLONY COUNT: CPT

## 2018-06-13 PROCEDURE — 84481 FREE ASSAY (FT-3): CPT

## 2018-06-13 PROCEDURE — 99214 OFFICE O/P EST MOD 30 MIN: CPT | Mod: 25,S$GLB,, | Performed by: INTERNAL MEDICINE

## 2018-06-13 PROCEDURE — 80053 COMPREHEN METABOLIC PANEL: CPT

## 2018-06-13 NOTE — PROGRESS NOTES
Subjective:      Patient ID: Janis Brar is a 48 y.o. female.    Chief Complaint: Hypertension; Fever; and Urinary Frequency      HPI     Ms. Janis Brar is a patient of Chito Villalta MD, who presents for subjective fever and frequent urination since Monday. She reports urinating ~10 times per day, which is usually 4 x / day. She also reports feeling warm all the time while her co-workers felt cold. This was different from her previous hot flashes in duration, these episode last much longer. No dysuria. No nocturia. No significant hair loss or nail changes, but endorses dryer skin than usual. She endorses taking her [place] (pork thyroid) 60 mg 1 Tab qd as prescribed. She was previously prescribed levothyroxine and T3, both of which she discontinued.     She reports feeling bloated since starting her felodipine 10 mg qd 3 months ago. She is also taking HCTZ 25 mg qd and losartan 100 mg 1 po qd.       Past Medical History:   Diagnosis Date    Asthma     Hypertension      Past Surgical History:   Procedure Laterality Date    HYSTERECTOMY       Social History     Social History    Marital status:      Spouse name: N/A    Number of children: N/A    Years of education: N/A     Occupational History    Not on file.     Social History Main Topics    Smoking status: Never Smoker    Smokeless tobacco: Not on file    Alcohol use Yes      Comment: ocasionally     Drug use: No    Sexual activity: Yes     Partners: Male     Other Topics Concern    Not on file     Social History Narrative    No narrative on file     Family History   Problem Relation Age of Onset    Hypertension Father     Hypertension Mother     Hypothyroidism Mother        Current Outpatient Prescriptions:     felodipine (PLENDIL) 10 MG 24 hr tablet, Take 10 mg by mouth., Disp: , Rfl:     hydrochlorothiazide (HYDRODIURIL) 25 MG tablet, Take 1tb daily, Disp: , Rfl:     ketoconazole (NIZORAL) 2 % shampoo, ,  "Disp: , Rfl:     losartan (COZAAR) 100 MG tablet, Take 100 mg by mouth., Disp: , Rfl:     thyroid, pork, 60 mg Tab, Take 60 mg by mouth., Disp: , Rfl:     albuterol 90 mcg/actuation inhaler, Inhale 1-2 puffs into the lungs every 6 (six) hours as needed for Wheezing (cough)., Disp: 1 Inhaler, Rfl: 0    desonide (DESOWEN) 0.05 % lotion, , Disp: , Rfl:     estradiol (YUVAFEM) 10 mcg Tab, , Disp: , Rfl:     fluticasone (FLONASE) 50 mcg/actuation nasal spray, 1 spray., Disp: , Rfl:     ipratropium (ATROVENT) 0.03 % nasal spray, , Disp: , Rfl:     promethazine-dextromethorphan (PROMETHAZINE-DM) 6.25-15 mg/5 mL Syrp, Take 5 mLs by mouth nightly as needed. May cause drowsiness, Disp: 120 mL, Rfl: 0    Current Facility-Administered Medications:     sodium chloride 0.9% nebulizer solution 3 mL, 3 mL, Nebulization, PRN, Chito iVllalta MD, 3 mL at 03/20/17 1643    Review of patient's allergies indicates:   Allergen Reactions    Amlodipine      Weight gain and constipation    Fosinopril Other (See Comments)     Cough        Review of Systems   Negative    Objective:     BP (!) 142/86 (BP Location: Right arm, Patient Position: Sitting, BP Method: Large (Manual))   Pulse 93   Temp 97.8 °F (36.6 °C) (Tympanic)   Ht 5' 3" (1.6 m)   Wt 97.2 kg (214 lb 4.6 oz)   SpO2 97%   BMI 37.96 kg/m²     Physical Exam  GEN: A&O fully, NAD  PSYC: Normal affect      Lab Results   Component Value Date    WBC 7.30 03/20/2017    HGB 13.1 03/20/2017    HCT 41.2 03/20/2017     03/20/2017    CHOL 151 10/31/2017     03/20/2017    K 3.3 (L) 03/20/2017     03/20/2017    CREATININE 0.8 03/20/2017    BUN 9 03/20/2017    CO2 28 03/20/2017       Assessment:      1. Impaired glucose tolerance: May be cause of urinary frequency. Will check bmp and also r/o uti with u/z.    2. Acquired hypothyroidism: May be functionally hyperthyroid given sx. Will check TFTs.   3. Urinary frequency: As above.    4. Heat intolerance: As above. "        Plan:   Impaired glucose tolerance  -     Cancel: Basic metabolic panel; Future; Expected date: 06/13/2018  -     Lipid panel; Future; Expected date: 06/13/2018  -     Vitamin D; Future; Expected date: 06/13/2018  -     CBC auto differential; Future; Expected date: 06/13/2018  -     Comprehensive metabolic panel; Future; Expected date: 06/13/2018    Acquired hypothyroidism  -     T4, free; Future; Expected date: 06/13/2018  -     TSH; Future; Expected date: 06/13/2018  -     T3, free; Future; Expected date: 06/13/2018    Urinary frequency  -     POCT URINE DIPSTICK WITH MICROSCOPE, AUTOMATED  -     Cancel: Basic metabolic panel; Future; Expected date: 06/13/2018    Heat intolerance  -     T4, free; Future; Expected date: 06/13/2018  -     TSH; Future; Expected date: 06/13/2018  -     T3, free; Future; Expected date: 06/13/2018        Follow-up in about 3 months (around 9/13/2018), or if symptoms worsen or fail to improve, for FU heat intolerance, obesity.

## 2018-06-14 ENCOUNTER — PATIENT MESSAGE (OUTPATIENT)
Dept: INTERNAL MEDICINE | Facility: CLINIC | Age: 48
End: 2018-06-14

## 2018-06-15 ENCOUNTER — TELEPHONE (OUTPATIENT)
Dept: INTERNAL MEDICINE | Facility: CLINIC | Age: 48
End: 2018-06-15

## 2018-06-15 LAB
BACTERIA UR CULT: NORMAL
BACTERIA UR CULT: NORMAL

## 2018-06-15 NOTE — TELEPHONE ENCOUNTER
----- Message from Yazmin Hennessy sent at 6/15/2018  2:08 PM CDT -----  Contact: pt  Returning a call.

## 2018-06-15 NOTE — TELEPHONE ENCOUNTER
----- Message from Zahida Jones sent at 6/14/2018  2:50 PM CDT -----  Contact: Pt  Pt would like to be called back regarding her lab results    Pt can be reached at [phone]

## 2018-06-22 ENCOUNTER — OFFICE VISIT (OUTPATIENT)
Dept: INTERNAL MEDICINE | Facility: CLINIC | Age: 48
End: 2018-06-22
Payer: COMMERCIAL

## 2018-06-22 VITALS
BODY MASS INDEX: 37.53 KG/M2 | DIASTOLIC BLOOD PRESSURE: 76 MMHG | SYSTOLIC BLOOD PRESSURE: 116 MMHG | HEART RATE: 78 BPM | TEMPERATURE: 98 F | WEIGHT: 211.88 LBS

## 2018-06-22 DIAGNOSIS — F41.1 GENERALIZED ANXIETY DISORDER: ICD-10-CM

## 2018-06-22 DIAGNOSIS — E66.01 MORBID OBESITY: Primary | ICD-10-CM

## 2018-06-22 DIAGNOSIS — I10 ESSENTIAL HYPERTENSION: ICD-10-CM

## 2018-06-22 PROCEDURE — 99214 OFFICE O/P EST MOD 30 MIN: CPT | Mod: S$GLB,,, | Performed by: INTERNAL MEDICINE

## 2018-06-22 PROCEDURE — 3074F SYST BP LT 130 MM HG: CPT | Mod: CPTII,S$GLB,, | Performed by: INTERNAL MEDICINE

## 2018-06-22 PROCEDURE — 3008F BODY MASS INDEX DOCD: CPT | Mod: CPTII,S$GLB,, | Performed by: INTERNAL MEDICINE

## 2018-06-22 PROCEDURE — 99999 PR PBB SHADOW E&M-EST. PATIENT-LVL III: CPT | Mod: PBBFAC,,, | Performed by: INTERNAL MEDICINE

## 2018-06-22 PROCEDURE — 3078F DIAST BP <80 MM HG: CPT | Mod: CPTII,S$GLB,, | Performed by: INTERNAL MEDICINE

## 2018-06-22 RX ORDER — FLUTICASONE FUROATE AND VILANTEROL 100; 25 UG/1; UG/1
1 POWDER RESPIRATORY (INHALATION) DAILY
COMMUNITY

## 2018-06-22 RX ORDER — SALICYLIC ACID 60 MG/G
GEL TOPICAL
COMMUNITY

## 2018-08-27 ENCOUNTER — OFFICE VISIT (OUTPATIENT)
Dept: INTERNAL MEDICINE | Facility: CLINIC | Age: 48
End: 2018-08-27
Payer: COMMERCIAL

## 2018-08-27 VITALS
OXYGEN SATURATION: 98 % | HEART RATE: 89 BPM | SYSTOLIC BLOOD PRESSURE: 138 MMHG | WEIGHT: 219.81 LBS | DIASTOLIC BLOOD PRESSURE: 80 MMHG | BODY MASS INDEX: 38.95 KG/M2 | TEMPERATURE: 97 F | RESPIRATION RATE: 18 BRPM | HEIGHT: 63 IN

## 2018-08-27 DIAGNOSIS — F41.9 ANXIETY: ICD-10-CM

## 2018-08-27 DIAGNOSIS — R11.0 NAUSEA: ICD-10-CM

## 2018-08-27 DIAGNOSIS — H81.11 BENIGN PAROXYSMAL POSITIONAL VERTIGO OF RIGHT EAR: Primary | ICD-10-CM

## 2018-08-27 PROCEDURE — 99999 PR PBB SHADOW E&M-EST. PATIENT-LVL III: CPT | Mod: PBBFAC,,, | Performed by: INTERNAL MEDICINE

## 2018-08-27 PROCEDURE — 3075F SYST BP GE 130 - 139MM HG: CPT | Mod: CPTII,S$GLB,, | Performed by: INTERNAL MEDICINE

## 2018-08-27 PROCEDURE — 99214 OFFICE O/P EST MOD 30 MIN: CPT | Mod: S$GLB,,, | Performed by: INTERNAL MEDICINE

## 2018-08-27 PROCEDURE — 3079F DIAST BP 80-89 MM HG: CPT | Mod: CPTII,S$GLB,, | Performed by: INTERNAL MEDICINE

## 2018-08-27 PROCEDURE — 3008F BODY MASS INDEX DOCD: CPT | Mod: CPTII,S$GLB,, | Performed by: INTERNAL MEDICINE

## 2018-08-27 RX ORDER — HYDROXYZINE HYDROCHLORIDE 25 MG/1
25 TABLET, FILM COATED ORAL NIGHTLY PRN
Qty: 30 TABLET | Refills: 5 | Status: SHIPPED | OUTPATIENT
Start: 2018-08-27 | End: 2019-06-18

## 2018-08-27 RX ORDER — ESCITALOPRAM OXALATE 20 MG/1
20 TABLET ORAL DAILY
COMMUNITY
End: 2019-06-07 | Stop reason: SDUPTHER

## 2018-08-27 RX ORDER — FELODIPINE 10 MG/1
10 TABLET, EXTENDED RELEASE ORAL DAILY
Qty: 90 TABLET | Refills: 3 | Status: SHIPPED | OUTPATIENT
Start: 2018-08-27 | End: 2019-08-25 | Stop reason: SDUPTHER

## 2018-08-27 NOTE — PROGRESS NOTES
Subjective:      Patient ID: Janis Brar is a 48 y.o. female.    Chief Complaint: Hypertension and Dizziness      HPI     Ms. Janis Brar is a patient of Chito Villalta MD, who presents for HTN and reports dizziness over the past 2 weeks, which occurs several times/day, each lasting <10 min, self-resolve. +nausea, no emesis. No known triggers. Of note, she recently had a sore throat & productive cough that started Thursday, which is improving. She has been taking vitamin C daily, Flonase and Tylenol Cold, which have helped. Her throat is no longer sore.     She reports restarting her Lexapro last week (no known trigger for her anxiety), which she had previously been prescribed for anxiety years ago following her miscarriage. Of note, she started escitalopram after her dizziness already started.       Past Medical History:   Diagnosis Date    Asthma     Hypertension     Seen by Dr. Jonny Wallace MD at [facility] (No known CAD); On felodipine ER 10 qd, HCTZ 25 qd, Losartan 100 qd    JUAN A on CPAP 07/01/2018    On CPAP, which helps    Severe obesity (BMI 35.0-39.9) with comorbidity     Cardiomegally on CT 5/2017; HTN; Awaiting home sleep study as of 7/17/18 (visit w/ Dr. Wallace)     Past Surgical History:   Procedure Laterality Date    HYSTERECTOMY       Social History     Socioeconomic History    Marital status:      Spouse name: Not on file    Number of children: Not on file    Years of education: Not on file    Highest education level: Not on file   Social Needs    Financial resource strain: Not on file    Food insecurity - worry: Not on file    Food insecurity - inability: Not on file    Transportation needs - medical: Not on file    Transportation needs - non-medical: Not on file   Occupational History    Not on file   Tobacco Use    Smoking status: Never Smoker   Substance and Sexual Activity    Alcohol use: Yes     Comment: ocasionally     Drug use: No    Sexual  activity: Yes     Partners: Male   Other Topics Concern    Not on file   Social History Narrative    Patient goal(s): She wants to lose 40 lbs up front to have more energy, decrease BP & CV risk to live longer, b/c she loves her family, who she wants to support long-term    Breakfast:     Lunch:     Dinner:    Snacks:    Eating out:    Water: 50-60 oz/d    Physical Activity: xxx minutes/day, xxx days/week     Family History   Problem Relation Age of Onset    Hypertension Father     Hypertension Mother     Hypothyroidism Mother        Current Outpatient Medications:     albuterol 90 mcg/actuation inhaler, Inhale 1-2 puffs into the lungs every 6 (six) hours as needed for Wheezing (cough)., Disp: 1 Inhaler, Rfl: 0    desonide (DESOWEN) 0.05 % lotion, , Disp: , Rfl:     escitalopram oxalate (LEXAPRO) 20 MG tablet, Take 20 mg by mouth once daily., Disp: , Rfl:     estradiol (YUVAFEM) 10 mcg Tab, , Disp: , Rfl:     felodipine (PLENDIL) 10 MG 24 hr tablet, Take 1 tablet (10 mg total) by mouth once daily., Disp: 90 tablet, Rfl: 3    fluticasone (FLONASE) 50 mcg/actuation nasal spray, 1 spray., Disp: , Rfl:     fluticasone-vilanterol (BREO) 100-25 mcg/dose diskus inhaler, Inhale 1 puff into the lungs once daily. Controller, Disp: , Rfl:     hydrochlorothiazide (HYDRODIURIL) 25 MG tablet, Take 1tb daily, Disp: , Rfl:     ipratropium (ATROVENT) 0.03 % nasal spray, , Disp: , Rfl:     ketoconazole (NIZORAL) 2 % shampoo, , Disp: , Rfl:     losartan (COZAAR) 100 MG tablet, Take 100 mg by mouth., Disp: , Rfl:     promethazine-dextromethorphan (PROMETHAZINE-DM) 6.25-15 mg/5 mL Syrp, Take 5 mLs by mouth nightly as needed. May cause drowsiness, Disp: 120 mL, Rfl: 0    salicylic acid 6 % gel, Apply topically twice a week., Disp: , Rfl:     thyroid, pork, 60 mg Tab, Take 60 mg by mouth., Disp: , Rfl:     hydrOXYzine HCl (ATARAX) 25 MG tablet, Take 1 tablet (25 mg total) by mouth nightly as needed for Anxiety  "(insomnia)., Disp: 30 tablet, Rfl: 5    Current Facility-Administered Medications:     sodium chloride 0.9% nebulizer solution 3 mL, 3 mL, Nebulization, PRN, Chito Villalta MD, 3 mL at 03/20/17 1643    Review of patient's allergies indicates:   Allergen Reactions    Amlodipine      Weight gain and constipation    Biaxin [clarithromycin]     Fosinopril Other (See Comments)     Cough        Review of Systems   All remaining systems negative    Objective:     /80 (BP Location: Left arm, Patient Position: Sitting, BP Method: Large (Manual))   Pulse 89   Temp 97.1 °F (36.2 °C) (Tympanic)   Resp 18   Ht 5' 3" (1.6 m)   Wt 99.7 kg (219 lb 12.8 oz)   SpO2 98%   BMI 38.94 kg/m²     Physical Exam  GEN: A&O fully, NAD  PSYC: Normal affect  NEURO: +Steven-Hallpike on right with horizontal nystagmus;   CN II-XII intact, 5/5 strength globally, no sensory losses, +unsteady tandem gait, Neg Romberg      Lab Results   Component Value Date    WBC 10.31 06/13/2018    HGB 12.9 06/13/2018    HCT 39.6 06/13/2018     06/13/2018    CHOL 189 06/13/2018    TRIG 101 06/13/2018    HDL 49 06/13/2018    LDLCALC 119.8 06/13/2018    ALT 20 06/13/2018    AST 19 06/13/2018     06/13/2018    K 3.7 06/13/2018     06/13/2018    CREATININE 0.8 06/13/2018    BUN 16 06/13/2018    CO2 32 (H) 06/13/2018    CALCIUM 9.8 06/13/2018       Assessment:      1. Benign paroxysmal positional vertigo of right ear: Risks and benefits discussed and patient chose to move forward with meclizine prn dizziness or nausea, which she already has at home, but will call if a new prescription is needed. Demonstrated, explained and provided written and pictorial examples of Modified Epply Maneuver.    2.      Anxiety: Exacerbated, but improved since restarting. Risks and benefits discussed and patient chose            to move forward with escitalopram oxalate 10 mg 1 po qd.    Plan:   Benign paroxysmal positional vertigo of right " ear    Nausea    Anxiety    Other orders  -     hydrOXYzine HCl (ATARAX) 25 MG tablet; Take 1 tablet (25 mg total) by mouth nightly as needed for Anxiety (insomnia).  Dispense: 30 tablet; Refill: 5  -     felodipine (PLENDIL) 10 MG 24 hr tablet; Take 1 tablet (10 mg total) by mouth once daily.  Dispense: 90 tablet; Refill: 3        Follow-up if symptoms worsen or fail to improve.    I spent 25 minutes of time with patient 50% or more of which was discussing labs and plans of care.

## 2018-08-30 ENCOUNTER — PATIENT OUTREACH (OUTPATIENT)
Dept: ADMINISTRATIVE | Facility: HOSPITAL | Age: 48
End: 2018-08-30

## 2018-09-13 ENCOUNTER — OFFICE VISIT (OUTPATIENT)
Dept: INTERNAL MEDICINE | Facility: CLINIC | Age: 48
End: 2018-09-13
Payer: COMMERCIAL

## 2018-09-13 VITALS
TEMPERATURE: 99 F | HEIGHT: 63 IN | OXYGEN SATURATION: 97 % | BODY MASS INDEX: 36.64 KG/M2 | RESPIRATION RATE: 18 BRPM | WEIGHT: 206.81 LBS | DIASTOLIC BLOOD PRESSURE: 76 MMHG | SYSTOLIC BLOOD PRESSURE: 110 MMHG | HEART RATE: 96 BPM

## 2018-09-13 DIAGNOSIS — I10 ESSENTIAL HYPERTENSION: Primary | ICD-10-CM

## 2018-09-13 DIAGNOSIS — R42 DIZZINESS: ICD-10-CM

## 2018-09-13 PROCEDURE — 99999 PR PBB SHADOW E&M-EST. PATIENT-LVL III: CPT | Mod: PBBFAC,,, | Performed by: INTERNAL MEDICINE

## 2018-09-13 PROCEDURE — 3008F BODY MASS INDEX DOCD: CPT | Mod: CPTII,S$GLB,, | Performed by: INTERNAL MEDICINE

## 2018-09-13 PROCEDURE — 3078F DIAST BP <80 MM HG: CPT | Mod: CPTII,S$GLB,, | Performed by: INTERNAL MEDICINE

## 2018-09-13 PROCEDURE — 3074F SYST BP LT 130 MM HG: CPT | Mod: CPTII,S$GLB,, | Performed by: INTERNAL MEDICINE

## 2018-09-13 PROCEDURE — 99214 OFFICE O/P EST MOD 30 MIN: CPT | Mod: S$GLB,,, | Performed by: INTERNAL MEDICINE

## 2018-09-13 RX ORDER — HYDROCHLOROTHIAZIDE 12.5 MG/1
12.5 CAPSULE ORAL DAILY
Qty: 30 CAPSULE | Refills: 11 | Status: SHIPPED | OUTPATIENT
Start: 2018-09-13 | End: 2019-04-16

## 2018-09-13 NOTE — PROGRESS NOTES
Subjective:      Patient ID: Janis Brar is a 48 y.o. female.    Chief Complaint: Follow-up (3 months**)      HPI     Ms. Janis Brar is a patient of Chito Villalta MD, who presents for f/u.     She reports her BPPV completely resolved 1 week following her last visit. She reports noting some dysequilibrium since Monday. No pain in her ears. She reports taking her old prescription of meclizine 25 mg yesterday, which hasn't seemed to help. She reports taking her Xyzal (anti-histamine) almost every night over the past 2 weeks, which she feels may have helped with nasal congestion.       Past Medical History:   Diagnosis Date    Asthma     Hypertension     Seen by Dr. Jonny Wallace MD at [facility] [place] (No known CAD); On felodipine ER 10 qd, HCTZ 25 qd, Losartan 100 qd    JUAN A on CPAP 07/01/2018    On CPAP, which helps    Severe obesity (BMI 35.0-39.9) with comorbidity     Cardiomegally on CT 5/2017; HTN; Awaiting home sleep study as of 7/17/18 (visit w/ Dr. Wallace)     Past Surgical History:   Procedure Laterality Date    HYSTERECTOMY       Social History     Socioeconomic History    Marital status:      Spouse name: Not on file    Number of children: Not on file    Years of education: Not on file    Highest education level: Not on file   Social Needs    Financial resource strain: Not on file    Food insecurity - worry: Not on file    Food insecurity - inability: Not on file    Transportation needs - medical: Not on file    Transportation needs - non-medical: Not on file   Occupational History    Not on file   Tobacco Use    Smoking status: Never Smoker   Substance and Sexual Activity    Alcohol use: Yes     Comment: ocasionally     Drug use: No    Sexual activity: Yes     Partners: Male   Other Topics Concern    Not on file   Social History Narrative    Patient goal(s): She wants to lose 40 lbs up front to have more energy, decrease BP & CV risk to live longer, b/c she  loves her family, who she wants to support long-term    Breakfast: 50% Yogurt or Special K w/berries or oatmeal w/ water or protein bar or Atkins protein drink; coffee 1-1.5 tsp sugar & non-dairy creamer    Lunch: Salad w/ meat & vegetable (minimal dressing) w/ fruit/tomato/cucumber or [employer]way veggie pizza; water or Coke Zero    Dinner: Errol Limon ([facility]): Talapia w/ steamed veggies; Yamileth     Snacks: fruit (berries & citrus) 2-3x/d or cereal 2-3x/wk or Cheese-Its 2 handfuls 2x/wk or chips (1 small bag) 2x/wk     Eating out: 3x/wk    Water: 64 oz/d (previously 50-60 oz/d)    Physical Activity: walks 30 min, 3x/wk (plans to go to gym 30-40 min 3x/wk)     Family History   Problem Relation Age of Onset    Hypertension Father     Hypertension Mother     Hypothyroidism Mother        Current Outpatient Medications:     albuterol 90 mcg/actuation inhaler, Inhale 1-2 puffs into the lungs every 6 (six) hours as needed for Wheezing (cough)., Disp: 1 Inhaler, Rfl: 0    desonide (DESOWEN) 0.05 % lotion, , Disp: , Rfl:     escitalopram oxalate (LEXAPRO) 20 MG tablet, Take 20 mg by mouth once daily., Disp: , Rfl:     felodipine (PLENDIL) 10 MG 24 hr tablet, Take 1 tablet (10 mg total) by mouth once daily., Disp: 90 tablet, Rfl: 3    fluticasone (FLONASE) 50 mcg/actuation nasal spray, 1 spray., Disp: , Rfl:     fluticasone-vilanterol (BREO) 100-25 mcg/dose diskus inhaler, Inhale 1 puff into the lungs once daily. Controller, Disp: , Rfl:     hydrOXYzine HCl (ATARAX) 25 MG tablet, Take 1 tablet (25 mg total) by mouth nightly as needed for Anxiety (insomnia)., Disp: 30 tablet, Rfl: 5    ipratropium (ATROVENT) 0.03 % nasal spray, , Disp: , Rfl:     ketoconazole (NIZORAL) 2 % shampoo, , Disp: , Rfl:     losartan (COZAAR) 100 MG tablet, Take 100 mg by mouth., Disp: , Rfl:     promethazine-dextromethorphan (PROMETHAZINE-DM) 6.25-15 mg/5 mL Syrp, Take 5 mLs by mouth nightly as needed. May cause drowsiness,  "Disp: 120 mL, Rfl: 0    salicylic acid 6 % gel, Apply topically twice a week., Disp: , Rfl:     thyroid, pork, 60 mg Tab, Take 60 mg by mouth., Disp: , Rfl:     hydroCHLOROthiazide (MICROZIDE) 12.5 mg capsule, Take 1 capsule (12.5 mg total) by mouth once daily., Disp: 30 capsule, Rfl: 11    Current Facility-Administered Medications:     sodium chloride 0.9% nebulizer solution 3 mL, 3 mL, Nebulization, PRN, Chito Villalta MD, 3 mL at 03/20/17 1643    Review of patient's allergies indicates:   Allergen Reactions    Amlodipine      Weight gain and constipation    Biaxin [clarithromycin]     Fosinopril Other (See Comments)     Cough        Review of Systems   All remaining systems negative    Objective:     /76 (BP Location: Left arm, Patient Position: Sitting, BP Method: Large (Manual))   Pulse 96   Temp 98.9 °F (37.2 °C) (Tympanic)   Resp 18   Ht 5' 3" (1.6 m)   Wt 93.8 kg (206 lb 12.7 oz)   SpO2 97%   BMI 36.63 kg/m²     Physical Exam  GEN: A&O fully, NAD  PSYC: Normal affect  HEENT: OP: Clear, no LAD, no thyroid masses, auditory canals and TMs WNL  CV: RRR, no M/G/R  PULM: CTA bilaterally, no wheezes, rales, crackles   NEURO: -Hallpike maneuver bilaterally; no nystagmus or nausea      Lab Results   Component Value Date    WBC 10.31 06/13/2018    HGB 12.9 06/13/2018    HCT 39.6 06/13/2018     06/13/2018    CHOL 189 06/13/2018    TRIG 101 06/13/2018    HDL 49 06/13/2018    LDLCALC 119.8 06/13/2018    ALT 20 06/13/2018    AST 19 06/13/2018     06/13/2018    K 3.7 06/13/2018     06/13/2018    CREATININE 0.8 06/13/2018    BUN 16 06/13/2018    CO2 32 (H) 06/13/2018    CALCIUM 9.8 06/13/2018       Assessment:      1. Essential hypertension: BP on lower side of normal, likely 2/2 weight loss and mild chronic dehydration. Encouraged to increase water to 100 oz/day. Risks and benefits discussed and patient chose to move forward with decreasing her HCTZ from 25 to 12.5 mg 1 tab po qd. "   2. Dizziness: Likely 2/2 to above. No PX findings of recurrent BPPV.      Plan:   Essential hypertension  -     hydroCHLOROthiazide (MICROZIDE) 12.5 mg capsule; Take 1 capsule (12.5 mg total) by mouth once daily.  Dispense: 30 capsule; Refill: 11    Dizziness        Follow-up in about 3 months (around 12/13/2018), or if symptoms worsen or fail to improve, for FU on dizziness, HTN, obesity.    I spent 25 minutes of time with patient 50% or more of which was discussing labs and plans of care.

## 2018-09-24 ENCOUNTER — PATIENT OUTREACH (OUTPATIENT)
Dept: ADMINISTRATIVE | Facility: HOSPITAL | Age: 48
End: 2018-09-24

## 2018-12-20 ENCOUNTER — OFFICE VISIT (OUTPATIENT)
Dept: INTERNAL MEDICINE | Facility: CLINIC | Age: 48
End: 2018-12-20
Payer: COMMERCIAL

## 2018-12-20 VITALS
WEIGHT: 211 LBS | RESPIRATION RATE: 18 BRPM | BODY MASS INDEX: 37.39 KG/M2 | OXYGEN SATURATION: 97 % | TEMPERATURE: 97 F | HEIGHT: 63 IN | SYSTOLIC BLOOD PRESSURE: 134 MMHG | HEART RATE: 89 BPM | DIASTOLIC BLOOD PRESSURE: 61 MMHG

## 2018-12-20 DIAGNOSIS — G47.33 OSA ON CPAP: ICD-10-CM

## 2018-12-20 DIAGNOSIS — F41.1 GENERALIZED ANXIETY DISORDER: ICD-10-CM

## 2018-12-20 DIAGNOSIS — I10 ESSENTIAL HYPERTENSION: Primary | ICD-10-CM

## 2018-12-20 PROCEDURE — 3078F DIAST BP <80 MM HG: CPT | Mod: CPTII,S$GLB,, | Performed by: NURSE PRACTITIONER

## 2018-12-20 PROCEDURE — 3008F BODY MASS INDEX DOCD: CPT | Mod: CPTII,S$GLB,, | Performed by: NURSE PRACTITIONER

## 2018-12-20 PROCEDURE — 99213 OFFICE O/P EST LOW 20 MIN: CPT | Mod: 25,S$GLB,, | Performed by: NURSE PRACTITIONER

## 2018-12-20 PROCEDURE — 3075F SYST BP GE 130 - 139MM HG: CPT | Mod: CPTII,S$GLB,, | Performed by: NURSE PRACTITIONER

## 2018-12-20 PROCEDURE — 99999 PR PBB SHADOW E&M-EST. PATIENT-LVL IV: CPT | Mod: PBBFAC,,, | Performed by: NURSE PRACTITIONER

## 2018-12-20 NOTE — PROGRESS NOTES
"Subjective:      Patient ID: Janis Brar is a 48 y.o. female.    Chief Complaint: Hypertension    HPI:  Patient states she noticed last night she had a headache, had muscle tension in her neck to the back of her head.  She felt a little mild tightness in the center of her chest at work earlier that day for a short time. She checked her BP at home and was getting readings of 160/100, 154/96.  She feels better today.  Has had a lot of personal stress at home, several illnesses with family members.  She admits to not drinking a lot of water, not getting enough exercise. She is taking her meds as ordered.  No other complaints today.  Says she is sleeping well with Cpap    Past Medical History:   Diagnosis Date    Asthma     Hypertension     Seen by Dr. Jonny Wallace MD at [facility] (No known CAD); On felodipine ER 10 qd, HCTZ 25 qd, Losartan 100 qd    JUAN A on CPAP 07/01/2018    On CPAP, which helps    Severe obesity (BMI 35.0-39.9) with comorbidity     Cardiomegally on CT 5/2017; HTN; Awaiting home sleep study as of 7/17/18 (visit w/ Dr. Wallace)       Past Surgical History:   Procedure Laterality Date    HYSTERECTOMY         Lab Results   Component Value Date    WBC 10.31 06/13/2018    HGB 12.9 06/13/2018    HCT 39.6 06/13/2018     06/13/2018    CHOL 189 06/13/2018    TRIG 101 06/13/2018    HDL 49 06/13/2018    ALT 20 06/13/2018    AST 19 06/13/2018     06/13/2018    K 3.7 06/13/2018     06/13/2018    CREATININE 0.8 06/13/2018    BUN 16 06/13/2018    CO2 32 (H) 06/13/2018    TSH 0.731 06/13/2018       /61 (BP Location: Left arm, Patient Position: Sitting, BP Method: Large (Manual))   Pulse 89   Temp 96.9 °F (36.1 °C) (Tympanic)   Resp 18   Ht 5' 3" (1.6 m)   Wt 95.7 kg (210 lb 15.7 oz)   SpO2 97%   BMI 37.37 kg/m²       Review of Systems   Constitutional: Negative for appetite change, fatigue and unexpected weight change.   HENT: Negative for congestion, ear pain, postnasal " drip, rhinorrhea, sinus pressure, sneezing, sore throat, tinnitus, trouble swallowing and voice change.    Eyes: Negative for pain, discharge, redness, itching and visual disturbance.   Respiratory: Negative for cough, chest tightness, shortness of breath and wheezing.    Cardiovascular: Negative for chest pain, palpitations and leg swelling.   Gastrointestinal: Negative for abdominal distention, abdominal pain, blood in stool, constipation, diarrhea, nausea and vomiting.        No reflux.   Genitourinary: Negative for difficulty urinating, dyspareunia, flank pain, menstrual problem and pelvic pain.   Musculoskeletal: Negative for arthralgias, back pain, myalgias and neck stiffness.   Skin: Negative for color change and rash.   Neurological: Negative for dizziness and headaches.   Psychiatric/Behavioral: Negative for confusion and sleep disturbance. The patient is not nervous/anxious.       Objective:     Physical Exam  Assessment:      1. Essential hypertension    2. Generalized anxiety disorder    3. JUAN A on CPAP      Plan:   Essential hypertension    Generalized anxiety disorder    JUAN A on CPAP    BP normal now.  Most likely due to stress/anxiety.  Will keep a record of her BP readings at home, will notify her PCP if has readings >150/90. Encouraged good self care, exercising, drinking water. Had a high dose flu shot today      Current Outpatient Medications:     albuterol 90 mcg/actuation inhaler, Inhale 1-2 puffs into the lungs every 6 (six) hours as needed for Wheezing (cough)., Disp: 1 Inhaler, Rfl: 0    desonide (DESOWEN) 0.05 % lotion, , Disp: , Rfl:     escitalopram oxalate (LEXAPRO) 20 MG tablet, Take 20 mg by mouth once daily., Disp: , Rfl:     felodipine (PLENDIL) 10 MG 24 hr tablet, Take 1 tablet (10 mg total) by mouth once daily., Disp: 90 tablet, Rfl: 3    fluticasone (FLONASE) 50 mcg/actuation nasal spray, 1 spray., Disp: , Rfl:     fluticasone-vilanterol (BREO) 100-25 mcg/dose diskus inhaler,  Inhale 1 puff into the lungs once daily. Controller, Disp: , Rfl:     hydroCHLOROthiazide (MICROZIDE) 12.5 mg capsule, Take 1 capsule (12.5 mg total) by mouth once daily., Disp: 30 capsule, Rfl: 11    hydrOXYzine HCl (ATARAX) 25 MG tablet, Take 1 tablet (25 mg total) by mouth nightly as needed for Anxiety (insomnia)., Disp: 30 tablet, Rfl: 5    ipratropium (ATROVENT) 0.03 % nasal spray, , Disp: , Rfl:     ketoconazole (NIZORAL) 2 % shampoo, , Disp: , Rfl:     losartan (COZAAR) 100 MG tablet, Take 100 mg by mouth., Disp: , Rfl:     promethazine-dextromethorphan (PROMETHAZINE-DM) 6.25-15 mg/5 mL Syrp, Take 5 mLs by mouth nightly as needed. May cause drowsiness, Disp: 120 mL, Rfl: 0    salicylic acid 6 % gel, Apply topically twice a week., Disp: , Rfl:     thyroid, pork, 60 mg Tab, Take 60 mg by mouth., Disp: , Rfl:     Current Facility-Administered Medications:     sodium chloride 0.9% nebulizer solution 3 mL, 3 mL, Nebulization, PRN, Chito Villalta MD, 3 mL at 03/20/17 8902

## 2019-04-15 ENCOUNTER — TELEPHONE (OUTPATIENT)
Dept: INTERNAL MEDICINE | Facility: CLINIC | Age: 49
End: 2019-04-15

## 2019-04-15 DIAGNOSIS — Z12.39 SCREENING FOR BREAST CANCER: Primary | ICD-10-CM

## 2019-04-15 NOTE — TELEPHONE ENCOUNTER
----- Message from José Luis Waller sent at 4/15/2019  1:47 PM CDT -----  Contact: self  Type:  Mammogram    Caller is requesting to schedule their annual mammogram appointment.  Order is not listed in EPIC.  Please enter order and contact patient to schedule.  Name of Caller:bertha sandhu  Where would they like the mammogram performed?[facility]  Would the patient rather a call back or a response via [facility]? Call back  Best Call Back Number:[phone]  Additional Information: pt is also requesting getting order to have blood work done.    Thanks,  José Luis Waller

## 2019-04-15 NOTE — TELEPHONE ENCOUNTER
----- Message from José Luis Waller sent at 4/15/2019  1:47 PM CDT -----  Contact: self  Type:  Mammogram    Caller is requesting to schedule their annual mammogram appointment.  Order is not listed in EPIC.  Please enter order and contact patient to schedule.  Name of Caller:bertha sandhu  Where would they like the mammogram performed?[facility]  Would the patient rather a call back or a response via [facility]? Call back  Best Call Back Number:[phone]  Additional Information: pt is also requesting getting order to have blood work done.    Thanks,  José Luis Waller

## 2019-04-16 ENCOUNTER — LAB VISIT (OUTPATIENT)
Dept: LAB | Facility: HOSPITAL | Age: 49
End: 2019-04-16
Attending: INTERNAL MEDICINE
Payer: COMMERCIAL

## 2019-04-16 ENCOUNTER — OFFICE VISIT (OUTPATIENT)
Dept: INTERNAL MEDICINE | Facility: CLINIC | Age: 49
End: 2019-04-16
Payer: COMMERCIAL

## 2019-04-16 ENCOUNTER — TELEPHONE (OUTPATIENT)
Dept: OBSTETRICS AND GYNECOLOGY | Facility: CLINIC | Age: 49
End: 2019-04-16

## 2019-04-16 VITALS
SYSTOLIC BLOOD PRESSURE: 110 MMHG | HEIGHT: 63 IN | DIASTOLIC BLOOD PRESSURE: 84 MMHG | TEMPERATURE: 98 F | WEIGHT: 216.06 LBS | HEART RATE: 77 BPM | BODY MASS INDEX: 38.28 KG/M2

## 2019-04-16 DIAGNOSIS — R53.83 FATIGUE, UNSPECIFIED TYPE: ICD-10-CM

## 2019-04-16 DIAGNOSIS — F41.9 ANXIETY: ICD-10-CM

## 2019-04-16 DIAGNOSIS — I10 ESSENTIAL HYPERTENSION: ICD-10-CM

## 2019-04-16 DIAGNOSIS — Z76.89 ENCOUNTER TO ESTABLISH CARE: ICD-10-CM

## 2019-04-16 DIAGNOSIS — I10 ESSENTIAL HYPERTENSION: Primary | ICD-10-CM

## 2019-04-16 DIAGNOSIS — E03.9 HYPOTHYROIDISM, UNSPECIFIED TYPE: ICD-10-CM

## 2019-04-16 DIAGNOSIS — E66.01 SEVERE OBESITY (BMI 35.0-39.9) WITH COMORBIDITY: ICD-10-CM

## 2019-04-16 LAB
ALBUMIN SERPL BCP-MCNC: 4.2 G/DL (ref 3.5–5.2)
ALP SERPL-CCNC: 82 U/L (ref 55–135)
ALT SERPL W/O P-5'-P-CCNC: 14 U/L (ref 10–44)
ANION GAP SERPL CALC-SCNC: 8 MMOL/L (ref 8–16)
AST SERPL-CCNC: 15 U/L (ref 10–40)
BILIRUB SERPL-MCNC: 0.3 MG/DL (ref 0.1–1)
BUN SERPL-MCNC: 12 MG/DL (ref 6–20)
CALCIUM SERPL-MCNC: 9.4 MG/DL (ref 8.7–10.5)
CHLORIDE SERPL-SCNC: 99 MMOL/L (ref 95–110)
CO2 SERPL-SCNC: 33 MMOL/L (ref 23–29)
CREAT SERPL-MCNC: 0.7 MG/DL (ref 0.5–1.4)
EST. GFR  (AFRICAN AMERICAN): >60 ML/MIN/1.73 M^2
EST. GFR  (NON AFRICAN AMERICAN): >60 ML/MIN/1.73 M^2
GLUCOSE SERPL-MCNC: 114 MG/DL (ref 70–110)
POTASSIUM SERPL-SCNC: 3 MMOL/L (ref 3.5–5.1)
PROT SERPL-MCNC: 7.4 G/DL (ref 6–8.4)
SODIUM SERPL-SCNC: 140 MMOL/L (ref 136–145)
T3FREE SERPL-MCNC: 3 PG/ML (ref 2.3–4.2)
T4 FREE SERPL-MCNC: 0.81 NG/DL (ref 0.71–1.51)
TSH SERPL DL<=0.005 MIU/L-ACNC: 0.23 UIU/ML (ref 0.4–4)

## 2019-04-16 PROCEDURE — 3008F BODY MASS INDEX DOCD: CPT | Mod: CPTII,S$GLB,, | Performed by: INTERNAL MEDICINE

## 2019-04-16 PROCEDURE — 3079F PR MOST RECENT DIASTOLIC BLOOD PRESSURE 80-89 MM HG: ICD-10-PCS | Mod: CPTII,S$GLB,, | Performed by: INTERNAL MEDICINE

## 2019-04-16 PROCEDURE — 3079F DIAST BP 80-89 MM HG: CPT | Mod: CPTII,S$GLB,, | Performed by: INTERNAL MEDICINE

## 2019-04-16 PROCEDURE — 3074F SYST BP LT 130 MM HG: CPT | Mod: CPTII,S$GLB,, | Performed by: INTERNAL MEDICINE

## 2019-04-16 PROCEDURE — 36415 COLL VENOUS BLD VENIPUNCTURE: CPT | Mod: PO

## 2019-04-16 PROCEDURE — 99214 PR OFFICE/OUTPT VISIT, EST, LEVL IV, 30-39 MIN: ICD-10-PCS | Mod: S$GLB,,, | Performed by: INTERNAL MEDICINE

## 2019-04-16 PROCEDURE — 84443 ASSAY THYROID STIM HORMONE: CPT

## 2019-04-16 PROCEDURE — 99214 OFFICE O/P EST MOD 30 MIN: CPT | Mod: S$GLB,,, | Performed by: INTERNAL MEDICINE

## 2019-04-16 PROCEDURE — 3074F PR MOST RECENT SYSTOLIC BLOOD PRESSURE < 130 MM HG: ICD-10-PCS | Mod: CPTII,S$GLB,, | Performed by: INTERNAL MEDICINE

## 2019-04-16 PROCEDURE — 99999 PR PBB SHADOW E&M-EST. PATIENT-LVL III: ICD-10-PCS | Mod: PBBFAC,,, | Performed by: INTERNAL MEDICINE

## 2019-04-16 PROCEDURE — 84439 ASSAY OF FREE THYROXINE: CPT

## 2019-04-16 PROCEDURE — 99999 PR PBB SHADOW E&M-EST. PATIENT-LVL III: CPT | Mod: PBBFAC,,, | Performed by: INTERNAL MEDICINE

## 2019-04-16 PROCEDURE — 3008F PR BODY MASS INDEX (BMI) DOCUMENTED: ICD-10-PCS | Mod: CPTII,S$GLB,, | Performed by: INTERNAL MEDICINE

## 2019-04-16 PROCEDURE — 84481 FREE ASSAY (FT-3): CPT

## 2019-04-16 PROCEDURE — 80053 COMPREHEN METABOLIC PANEL: CPT

## 2019-04-16 RX ORDER — VALSARTAN AND HYDROCHLOROTHIAZIDE 320; 25 MG/1; MG/1
TABLET, FILM COATED ORAL
COMMUNITY
Start: 2019-04-02 | End: 2019-04-16

## 2019-04-16 RX ORDER — ATENOLOL AND CHLORTHALIDONE TABLET 50; 25 MG/1; MG/1
1 TABLET ORAL DAILY
Qty: 30 TABLET | Refills: 11 | Status: SHIPPED | OUTPATIENT
Start: 2019-04-16 | End: 2019-06-07 | Stop reason: SDUPTHER

## 2019-04-16 NOTE — PROGRESS NOTES
Subjective:      Patient ID: Janis Brar is a 48 y.o. female.    Chief Complaint: Follow-up      HPI     Ms. Janis Brar is a patient of Chito Villalta MD, who presents for Follow-up  dizziness, HTN, obesity.    She reports her dizziness is gone.     She reports increased family-related stress, including her daughter graduating from college to work at [employer] in the [employer] and her son planning a wedding on 9/2019. She reports taking hydroxyzine 25 mg po QHS prn (2x/wk), which helps. She has been taking escitalopram oxalate 20 mg qd, which she thinks doesn't help as much as it used to.     VS, labs & imaging reviewed and discussed with patient, including 6 lb wt gain from 12/20/18-4/16/19, which she attributes to being less intense on dietary modifications and perhaps gain in muscle mass from regular resistance training at work.       Past Medical History:   Diagnosis Date    Asthma     Hypertension     Seen by Dr. Jonny Wallace MD at [facility] (No known CAD); On felodipine ER 10 qd, HCTZ 25 qd, Losartan 100 qd    JUAN A on CPAP 07/01/2018    On CPAP, which helps    Severe obesity (BMI 35.0-39.9) with comorbidity     Cardiomegally on CT 5/2017; HTN; Awaiting home sleep study as of 7/17/18 (visit w/ Dr. Wallace)     Past Surgical History:   Procedure Laterality Date    HYSTERECTOMY       Social History     Socioeconomic History    Marital status:      Spouse name: Not on file    Number of children: Not on file    Years of education: Not on file    Highest education level: Not on file   Occupational History    Not on file   Social Needs    Financial resource strain: Not on file    Food insecurity:     Worry: Not on file     Inability: Not on file    Transportation needs:     Medical: Not on file     Non-medical: Not on file   Tobacco Use    Smoking status: Never Smoker   Substance and Sexual Activity    Alcohol use: Yes     Comment: ocasionally     Drug use: No    Sexual activity:  Yes     Partners: Male   Lifestyle    Physical activity:     Days per week: Not on file     Minutes per session: Not on file    Stress: Not on file   Relationships    Social connections:     Talks on phone: Not on file     Gets together: Not on file     Attends Bahai service: Not on file     Active member of club or organization: Not on file     Attends meetings of clubs or organizations: Not on file     Relationship status: Not on file   Other Topics Concern    Not on file   Social History Narrative    Patient goal(s): She wants to lose 40 lbs up front to have more energy, decrease BP & CV risk to live longer, b/c she loves her family, who she wants to support long-term    Breakfast: 50% Yogurt or Special K w/berries or oatmeal w/ water or protein bar or Atkins protein drink; coffee 1-1.5 tsp sugar & non-dairy creamer    Lunch: Salad w/ meat & vegetable (minimal dressing) w/ fruit/tomato/cucumber or Subway veggie pizza; water or Coke Zero    Dinner: Errol Limon ([employer]): [place] w/ steamed veggies; Yamileth     Snacks: fruit (berries & citrus) 2-3x/d or cereal 2-3x/wk or Cheese-Its 2 handfuls 2x/wk or chips (1 small bag) 2x/wk     Eating out: 3x/wk    Water: 64 oz/d (previously 50-60 oz/d)    Physical Activity: walks 30 min, 3x/wk (plans to go to gym 30-40 min 3x/wk)     Family History   Problem Relation Age of Onset    Hypertension Father     Hypertension Mother     Hypothyroidism Mother        Current Outpatient Medications:     albuterol 90 mcg/actuation inhaler, Inhale 1-2 puffs into the lungs every 6 (six) hours as needed for Wheezing (cough)., Disp: 1 Inhaler, Rfl: 0    desonide (DESOWEN) 0.05 % lotion, , Disp: , Rfl:     escitalopram oxalate (LEXAPRO) 20 MG tablet, Take 20 mg by mouth once daily., Disp: , Rfl:     felodipine (PLENDIL) 10 MG 24 hr tablet, Take 1 tablet (10 mg total) by mouth once daily., Disp: 90 tablet, Rfl: 3    fluticasone (FLONASE) 50 mcg/actuation nasal spray, 1  "spray., Disp: , Rfl:     fluticasone-vilanterol (BREO) 100-25 mcg/dose diskus inhaler, Inhale 1 puff into the lungs once daily. Controller, Disp: , Rfl:     hydrOXYzine HCl (ATARAX) 25 MG tablet, Take 1 tablet (25 mg total) by mouth nightly as needed for Anxiety (insomnia)., Disp: 30 tablet, Rfl: 5    ipratropium (ATROVENT) 0.03 % nasal spray, , Disp: , Rfl:     ketoconazole (NIZORAL) 2 % shampoo, , Disp: , Rfl:     salicylic acid 6 % gel, Apply topically twice a week., Disp: , Rfl:     thyroid, pork, 60 mg Tab, Take 60 mg by mouth., Disp: , Rfl:     atenolol-chlorthalidone (TENORETIC) 50-25 mg Tab, Take 1 tablet by mouth once daily., Disp: 30 tablet, Rfl: 11    Current Facility-Administered Medications:     sodium chloride 0.9% nebulizer solution 3 mL, 3 mL, Nebulization, PRN, Chito Villalta MD, 3 mL at 03/20/17 1643    Review of patient's allergies indicates:   Allergen Reactions    Amlodipine      Weight gain and constipation    Biaxin [clarithromycin]     Fosinopril Other (See Comments)     Cough        Review of Systems   All remaining systems negative    Objective:     /84 (BP Location: Left arm, Patient Position: Sitting, BP Method: Medium (Manual))   Pulse 77   Temp 98.2 °F (36.8 °C) (Tympanic)   Ht 5' 3" (1.6 m)   Wt 98 kg (216 lb 0.8 oz)   BMI 38.27 kg/m²     Physical Exam  GEN: A&O fully, NAD  PSYC: Normal affect      Lab Results   Component Value Date    WBC 10.31 06/13/2018    HGB 12.9 06/13/2018    HCT 39.6 06/13/2018     06/13/2018    CHOL 189 06/13/2018    TRIG 101 06/13/2018    HDL 49 06/13/2018    LDLCALC 119.8 06/13/2018    ALT 20 06/13/2018    AST 19 06/13/2018     06/13/2018    K 3.7 06/13/2018     06/13/2018    CREATININE 0.8 06/13/2018    BUN 16 06/13/2018    CO2 32 (H) 06/13/2018    CALCIUM 9.8 06/13/2018       Assessment:      1. Essential hypertension: Risks and benefits discussed and patient chose to move forward with exchanging her valsartan-HCT " with atenolol-chlorthalidone 50-25 mg 1 po qd.     2. Severe obesity (BMI 35.0-39.9) with comorbidity: +6 lbs from 12/2018-4/2019. Discussed strategies for lifestyle modifications, particularly systematically increasing physical activity (5-90 min/episode, 3-5 times/week), water (1/2 of body weight in ounces) and avoiding potatoes, sugar sweetened beverages, red/processed meats (including chicken), fast food, grains (rice/bread/pasta); and increasing yogurt, whole fruits, unsalted nuts to 3-5 servings/day, and daily weight logging; non-starchy vegetables, cooked beans, and un-fried seafood have weak effects on weight. Stategies reviewed, including MyFitness Pal or other free apps to keep on track.   3. Fatigue, unspecified type: Will check TFTs. Continue current [place] dose for now.    4. Hypothyroidism, unspecified type: As above.    5.      Anxiety: Exacerbated mildly family-related transitions. Risks and benefits discussed and patient            chose to move forward with talk tx and holding off on changing from escitalopram 20 to citalopram            20 qd for now.     Plan:   Essential hypertension  -     atenolol-chlorthalidone (TENORETIC) 50-25 mg Tab; Take 1 tablet by mouth once daily.  Dispense: 30 tablet; Refill: 11  -     Comprehensive metabolic panel; Future; Expected date: 04/16/2019    Severe obesity (BMI 35.0-39.9) with comorbidity    Fatigue, unspecified type  -     TSH; Future; Expected date: 04/16/2019  -     T4, free; Future; Expected date: 04/16/2019  -     T3, free; Future; Expected date: 04/16/2019    Hypothyroidism, unspecified type  -     TSH; Future; Expected date: 04/16/2019  -     T4, free; Future; Expected date: 04/16/2019  -     T3, free; Future; Expected date: 04/16/2019    Anxiety    Encounter to establish care  -     Ambulatory consult to Obstetrics / Gynecology      Follow up in about 3 months (around 7/16/2019), or if symptoms worsen or fail to improve, for FU on anxiety.    I  spent 25 minutes of time with patient 50% or more of which was discussing labs and plans of care.

## 2019-04-16 NOTE — TELEPHONE ENCOUNTER
Attempted call to pt to sched appt. No answer, left msg for pt to call clinic [facility]. Put pt on wed 4/24 @ jonnie @ 130pm pending approval phyllis pt.

## 2019-04-16 NOTE — TELEPHONE ENCOUNTER
----- Message from Leandra Stovall sent at 4/16/2019  2:56 PM CDT -----  Mrs stone call she wanted to try to come see dr lomeli on next wed please  , having hormone problems , she can be re back at [phone]

## 2019-04-16 NOTE — TELEPHONE ENCOUNTER
----- Message from Leandra Stovall sent at 4/16/2019  2:56 PM CDT -----  Mrs stone call she wanted to try to come see dr lomeli on next wed please  , having hormone problems , she can be re back at [phone]

## 2019-04-17 DIAGNOSIS — T50.2X5A DIURETIC-INDUCED HYPOKALEMIA: Primary | ICD-10-CM

## 2019-04-17 DIAGNOSIS — E87.6 DIURETIC-INDUCED HYPOKALEMIA: Primary | ICD-10-CM

## 2019-04-17 PROBLEM — R73.9 HYPERGLYCEMIA: Status: ACTIVE | Noted: 2019-04-17

## 2019-04-17 RX ORDER — POTASSIUM CHLORIDE 20 MEQ/1
20 TABLET, EXTENDED RELEASE ORAL 2 TIMES DAILY
Qty: 180 TABLET | Refills: 3 | Status: SHIPPED | OUTPATIENT
Start: 2019-04-17

## 2019-04-18 ENCOUNTER — TELEPHONE (OUTPATIENT)
Dept: INTERNAL MEDICINE | Facility: CLINIC | Age: 49
End: 2019-04-18

## 2019-04-18 DIAGNOSIS — Z12.39 SCREENING FOR BREAST CANCER: Primary | ICD-10-CM

## 2019-04-18 NOTE — TELEPHONE ENCOUNTER
Pt requesting mammogram order to be changed to external so she can have [facility] at [facility]. Please see pending order.

## 2019-04-18 NOTE — TELEPHONE ENCOUNTER
----- Message from Chito Villalta MD sent at 4/17/2019  5:26 PM CDT -----  Dear Lola,    Please let our patient know a low TSH by itself is not clinically relevant (actually can be an early sign of hyperthyroidism, which has the opposite effect on energy levels). She has plenty of reasons to feel fatigue, particularly hypokalemia and hyperglycemia, the later of which causes dehydration. I recommend she increase her potassium and water intake and to f/u in 1 month, at which time we can recheck thyroid function tests and CMP.     Please let us know if you have any questions or concerns.     Sincerely,  Chito Villalta MD      ----- Message -----  From: Lola Ingram LPN  Sent: 4/17/2019   5:12 PM  To: Chito Villalta MD

## 2019-04-22 ENCOUNTER — TELEPHONE (OUTPATIENT)
Dept: INTERNAL MEDICINE | Facility: CLINIC | Age: 49
End: 2019-04-22

## 2019-04-22 NOTE — TELEPHONE ENCOUNTER
----- Message from Aniyah Mendes sent at 4/22/2019  3:24 PM CDT -----  Contact: self   Patient requesting orders for mammogram faxed to [facility].  Please call back at [phone].      Thanks,  Aniyah Mendes

## 2019-04-25 ENCOUNTER — TELEPHONE (OUTPATIENT)
Dept: OBSTETRICS AND GYNECOLOGY | Facility: CLINIC | Age: 49
End: 2019-04-25

## 2019-04-25 NOTE — TELEPHONE ENCOUNTER
Attempted to contact patient. No answer. Left message for patient to return call to clinic. Wanted to assist patient in scheduling an appointment from referral in workque list.

## 2019-05-01 ENCOUNTER — LAB VISIT (OUTPATIENT)
Dept: LAB | Facility: HOSPITAL | Age: 49
End: 2019-05-01
Attending: INTERNAL MEDICINE
Payer: COMMERCIAL

## 2019-05-01 ENCOUNTER — OFFICE VISIT (OUTPATIENT)
Dept: INTERNAL MEDICINE | Facility: CLINIC | Age: 49
End: 2019-05-01
Payer: COMMERCIAL

## 2019-05-01 VITALS
TEMPERATURE: 98 F | HEART RATE: 69 BPM | BODY MASS INDEX: 37.73 KG/M2 | DIASTOLIC BLOOD PRESSURE: 78 MMHG | OXYGEN SATURATION: 96 % | RESPIRATION RATE: 18 BRPM | SYSTOLIC BLOOD PRESSURE: 122 MMHG | HEIGHT: 63 IN | WEIGHT: 212.94 LBS

## 2019-05-01 DIAGNOSIS — I10 ESSENTIAL HYPERTENSION: Primary | ICD-10-CM

## 2019-05-01 DIAGNOSIS — M79.602 LEFT ARM PAIN: ICD-10-CM

## 2019-05-01 DIAGNOSIS — I10 ESSENTIAL HYPERTENSION: ICD-10-CM

## 2019-05-01 DIAGNOSIS — M25.512 ACUTE PAIN OF LEFT SHOULDER: ICD-10-CM

## 2019-05-01 DIAGNOSIS — E66.01 SEVERE OBESITY (BMI 35.0-39.9) WITH COMORBIDITY: ICD-10-CM

## 2019-05-01 DIAGNOSIS — E87.6 HYPOKALEMIA: ICD-10-CM

## 2019-05-01 LAB
ANION GAP SERPL CALC-SCNC: 8 MMOL/L (ref 8–16)
BUN SERPL-MCNC: 13 MG/DL (ref 6–20)
CALCIUM SERPL-MCNC: 9.5 MG/DL (ref 8.7–10.5)
CHLORIDE SERPL-SCNC: 100 MMOL/L (ref 95–110)
CO2 SERPL-SCNC: 32 MMOL/L (ref 23–29)
CREAT SERPL-MCNC: 0.7 MG/DL (ref 0.5–1.4)
EST. GFR  (AFRICAN AMERICAN): >60 ML/MIN/1.73 M^2
EST. GFR  (NON AFRICAN AMERICAN): >60 ML/MIN/1.73 M^2
GLUCOSE SERPL-MCNC: 93 MG/DL (ref 70–110)
POTASSIUM SERPL-SCNC: 3.6 MMOL/L (ref 3.5–5.1)
SODIUM SERPL-SCNC: 140 MMOL/L (ref 136–145)

## 2019-05-01 PROCEDURE — 3078F DIAST BP <80 MM HG: CPT | Mod: CPTII,S$GLB,, | Performed by: INTERNAL MEDICINE

## 2019-05-01 PROCEDURE — 80048 BASIC METABOLIC PNL TOTAL CA: CPT

## 2019-05-01 PROCEDURE — 99999 PR PBB SHADOW E&M-EST. PATIENT-LVL III: ICD-10-PCS | Mod: PBBFAC,,, | Performed by: INTERNAL MEDICINE

## 2019-05-01 PROCEDURE — 36415 COLL VENOUS BLD VENIPUNCTURE: CPT | Mod: PO

## 2019-05-01 PROCEDURE — 3074F SYST BP LT 130 MM HG: CPT | Mod: CPTII,S$GLB,, | Performed by: INTERNAL MEDICINE

## 2019-05-01 PROCEDURE — 3008F BODY MASS INDEX DOCD: CPT | Mod: CPTII,S$GLB,, | Performed by: INTERNAL MEDICINE

## 2019-05-01 PROCEDURE — 99214 OFFICE O/P EST MOD 30 MIN: CPT | Mod: S$GLB,,, | Performed by: INTERNAL MEDICINE

## 2019-05-01 PROCEDURE — 99214 PR OFFICE/OUTPT VISIT, EST, LEVL IV, 30-39 MIN: ICD-10-PCS | Mod: S$GLB,,, | Performed by: INTERNAL MEDICINE

## 2019-05-01 PROCEDURE — 3008F PR BODY MASS INDEX (BMI) DOCUMENTED: ICD-10-PCS | Mod: CPTII,S$GLB,, | Performed by: INTERNAL MEDICINE

## 2019-05-01 PROCEDURE — 99999 PR PBB SHADOW E&M-EST. PATIENT-LVL III: CPT | Mod: PBBFAC,,, | Performed by: INTERNAL MEDICINE

## 2019-05-01 PROCEDURE — 3078F PR MOST RECENT DIASTOLIC BLOOD PRESSURE < 80 MM HG: ICD-10-PCS | Mod: CPTII,S$GLB,, | Performed by: INTERNAL MEDICINE

## 2019-05-01 PROCEDURE — 3074F PR MOST RECENT SYSTOLIC BLOOD PRESSURE < 130 MM HG: ICD-10-PCS | Mod: CPTII,S$GLB,, | Performed by: INTERNAL MEDICINE

## 2019-05-01 RX ORDER — DICLOFENAC SODIUM 10 MG/G
2 GEL TOPICAL 4 TIMES DAILY
Qty: 100 G | Refills: 1 | Status: SHIPPED | OUTPATIENT
Start: 2019-05-01

## 2019-05-01 NOTE — PROGRESS NOTES
Subjective:      Patient ID: Janis Brar is a 48 y.o. female.    Chief Complaint: Two week follow up      HPI     Ms. Janis Brar is a patient of Chito Villalta MD, who presents for Two week follow up   on HTN. She reports having bilateral calf cramps. She has tried to increase her dietary potassium and is taking her KCl 20 meq 1 po BID regularly. She wonders if this may be due to her diuretic.    Of note, EPIC indicates due preventive measures, including MMG, which she had done last week and was WNL at [facility].    She also reports falling by accidentally slipping on slippery tiles in Feb 2019 and fell on her left shoulder, which initially didn't hurt too much, but has progressively become more intermittently painful and swollen, particularly when laying on that side or having a deep tissue message.       Past Medical History:   Diagnosis Date    Asthma     Hyperglycemia 04/17/2019    Hypertension     Seen by Dr. Jonny Wallace MD at [facility] (No known CAD); On felodipine ER 10 qd, HCTZ 25 qd, Losartan 100 qd    JUAN A on CPAP 07/01/2018    On CPAP, which helps    Severe obesity (BMI 35.0-39.9) with comorbidity     Cardiomegally on CT 5/2017; HTN; Awaiting home sleep study as of 7/17/18 (visit w/ Dr. Wallace)     Past Surgical History:   Procedure Laterality Date    HYSTERECTOMY       Social History     Socioeconomic History    Marital status:      Spouse name: Not on file    Number of children: Not on file    Years of education: Not on file    Highest education level: Not on file   Occupational History    Not on file   Social Needs    Financial resource strain: Not on file    Food insecurity:     Worry: Not on file     Inability: Not on file    Transportation needs:     Medical: Not on file     Non-medical: Not on file   Tobacco Use    Smoking status: Never Smoker   Substance and Sexual Activity    Alcohol use: Yes     Comment: ocasionally     Drug use: No    Sexual  activity: Yes     Partners: Male   Lifestyle    Physical activity:     Days per week: Not on file     Minutes per session: Not on file    Stress: Not on file   Relationships    Social connections:     Talks on phone: Not on file     Gets together: Not on file     Attends Church service: Not on file     Active member of club or organization: Not on file     Attends meetings of clubs or organizations: Not on file     Relationship status: Not on file   Other Topics Concern    Not on file   Social History Narrative    Patient goal(s): She wants to lose 40 lbs up front to have more energy, decrease BP & CV risk to live longer, b/c she loves her family, who she wants to support long-term    Breakfast: 50% Yogurt or Special K w/berries or oatmeal w/ water or protein bar or Atkins protein drink; coffee 1-1.5 tsp sugar & non-dairy creamer    Lunch: Salad w/ meat & vegetable (minimal dressing) w/ fruit/tomato/cucumber or [employer] veggie pizza; water or Coke Zero    Dinner: Errol Limon ([facility]): [place] w/ steamed veggies; Yamileth     Snacks: fruit (berries & citrus) 2-3x/d or cereal 2-3x/wk or Cheese-Its 2 handfuls 2x/wk or chips (1 small bag) 2x/wk     Eating out: 3x/wk    Water: 66-99 oz/d (previously 64 oz/d, previously 50-60 oz/d)    Physical Activity: walks 30 min, 3x/wk (plans to go to gym 30-40 min 3x/wk)     Family History   Problem Relation Age of Onset    Hypertension Father     Hypertension Mother     Hypothyroidism Mother        Current Outpatient Medications:     albuterol 90 mcg/actuation inhaler, Inhale 1-2 puffs into the lungs every 6 (six) hours as needed for Wheezing (cough)., Disp: 1 Inhaler, Rfl: 0    atenolol-chlorthalidone (TENORETIC) 50-25 mg Tab, Take 1 tablet by mouth once daily., Disp: 30 tablet, Rfl: 11    escitalopram oxalate (LEXAPRO) 20 MG tablet, Take 20 mg by mouth once daily., Disp: , Rfl:     felodipine (PLENDIL) 10 MG 24 hr tablet, Take 1 tablet (10 mg total) by mouth  "once daily., Disp: 90 tablet, Rfl: 3    fluticasone-vilanterol (BREO) 100-25 mcg/dose diskus inhaler, Inhale 1 puff into the lungs once daily. Controller, Disp: , Rfl:     potassium chloride SA (K-DUR,KLOR-CON) 20 MEQ tablet, Take 1 tablet (20 mEq total) by mouth 2 (two) times daily., Disp: 180 tablet, Rfl: 3    thyroid, pork, 60 mg Tab, Take 60 mg by mouth., Disp: , Rfl:     desonide (DESOWEN) 0.05 % lotion, , Disp: , Rfl:     diclofenac sodium (VOLTAREN) 1 % Gel, Apply 2 g topically 4 (four) times daily., Disp: 100 g, Rfl: 1    fluticasone (FLONASE) 50 mcg/actuation nasal spray, 1 spray., Disp: , Rfl:     hydrOXYzine HCl (ATARAX) 25 MG tablet, Take 1 tablet (25 mg total) by mouth nightly as needed for Anxiety (insomnia)., Disp: 30 tablet, Rfl: 5    ipratropium (ATROVENT) 0.03 % nasal spray, , Disp: , Rfl:     ketoconazole (NIZORAL) 2 % shampoo, , Disp: , Rfl:     salicylic acid 6 % gel, Apply topically twice a week., Disp: , Rfl:   No current facility-administered medications for this visit.     Review of patient's allergies indicates:   Allergen Reactions    Amlodipine      Weight gain and constipation    Biaxin [clarithromycin]     Fosinopril Other (See Comments)     Cough        Review of Systems   All remaining systems negative    Objective:     /78 (BP Location: Right arm, Patient Position: Sitting, BP Method: Medium (Manual))   Pulse 69   Temp 98.3 °F (36.8 °C) (Tympanic)   Resp 18   Ht 5' 3" (1.6 m)   Wt 96.6 kg (212 lb 15.4 oz)   SpO2 96%   BMI 37.72 kg/m²     Physical Exam  GEN: A&O fully, NAD  PSYC: Normal affect      Lab Results   Component Value Date    WBC 10.31 06/13/2018    HGB 12.9 06/13/2018    HCT 39.6 06/13/2018     06/13/2018    CHOL 189 06/13/2018    TRIG 101 06/13/2018    HDL 49 06/13/2018    LDLCALC 119.8 06/13/2018    ALT 14 04/16/2019    AST 15 04/16/2019     04/16/2019    K 3.0 (L) 04/16/2019    CL 99 04/16/2019    CREATININE 0.7 04/16/2019    BUN 12 " 04/16/2019    CO2 33 (H) 04/16/2019    CALCIUM 9.4 04/16/2019       Assessment:      1. Essential hypertension: At goal! May be able to decrease dose of Tenoretic to 25-12.5 mg (from 50-25 mg) if repeat K still low despite high K diet and KCL 20 meq BID.     2. Hypokalemia: As above.    3.     Obesity: Congratulated her on her 4 lb wt loss over the past 1 month! Discussed strategies for           lifestyle modifications, particularly systematically increasing physical activity (5-90 min/episode, 3-5           times/week), water (1/2 of body weight in ounces) and avoiding potatoes, sugar sweetened           beverages, red/processed meats (including chicken), fast food, grains (rice/bread/pasta); and           increasing yogurt, whole fruits, unsalted nuts to 3-5 servings/day, and daily weight logging; non-          starchy vegetables, cooked beans, and un-fried seafood have weak effects on weight.  4.     Left upper arm pain, chronic: Progressively worsening. Recommended applying ice. I recommend           over-the-counter turmeric 500 mg 2 capsules by mouth daily (with a meal) and/or tylenol 650 mg           by mouth three times daily as needed. I recommend avoiding chronic (>2 weeks) NSAIDS           (nonsteroidal inflammatory drugs; i.e. Ibuprofen, Motrin, Advil, Aleve, Naprosyn, naproxen, Aspirin,           Goodies, [place], BC's powder, oral diclofenac, Mobic, meloxicam, etc.) to protect your           stomach from bleeding and to your kidneys from dysfunction. If these measures are not helpful,           I recommend physical therapy.       Plan:   Essential hypertension  -     Basic metabolic panel; Future; Expected date: 05/01/2019    Hypokalemia  -     Basic metabolic panel; Future; Expected date: 05/01/2019    Severe obesity (BMI 35.0-39.9) with comorbidity    Acute pain of left shoulder  -     diclofenac sodium (VOLTAREN) 1 % Gel; Apply 2 g topically 4 (four) times daily.  Dispense: 100 g; Refill:  1    Left arm pain        Follow up in about 3 months (around 8/1/2019), or if symptoms worsen or fail to improve, for FU on obesity, left arm pain.    I spent 25 minutes of time with patient 50% or more of which was discussing labs and plans of care.

## 2019-06-07 DIAGNOSIS — I10 ESSENTIAL HYPERTENSION: ICD-10-CM

## 2019-06-07 RX ORDER — ESCITALOPRAM OXALATE 20 MG/1
20 TABLET ORAL DAILY
Qty: 30 TABLET | Refills: 0 | Status: CANCELLED | OUTPATIENT
Start: 2019-06-07

## 2019-06-07 NOTE — TELEPHONE ENCOUNTER
----- Message from Lorene Lemus sent at 6/7/2019 12:06 PM CDT -----  Pt called asking to speak with nurse,she said she needs a refill on a prescription. Good call back number [phone]

## 2019-06-10 ENCOUNTER — OFFICE VISIT (OUTPATIENT)
Dept: OBSTETRICS AND GYNECOLOGY | Facility: CLINIC | Age: 49
End: 2019-06-10
Payer: COMMERCIAL

## 2019-06-10 VITALS
WEIGHT: 215.38 LBS | HEIGHT: 63 IN | BODY MASS INDEX: 38.16 KG/M2 | DIASTOLIC BLOOD PRESSURE: 78 MMHG | SYSTOLIC BLOOD PRESSURE: 124 MMHG

## 2019-06-10 DIAGNOSIS — Z01.419 ENCOUNTER FOR GYNECOLOGICAL EXAMINATION WITHOUT ABNORMAL FINDING: Primary | ICD-10-CM

## 2019-06-10 DIAGNOSIS — N64.4 PAIN OF BOTH BREASTS: ICD-10-CM

## 2019-06-10 PROCEDURE — 99396 PREV VISIT EST AGE 40-64: CPT | Mod: S$GLB,,, | Performed by: NURSE PRACTITIONER

## 2019-06-10 PROCEDURE — 3078F DIAST BP <80 MM HG: CPT | Mod: CPTII,S$GLB,, | Performed by: NURSE PRACTITIONER

## 2019-06-10 PROCEDURE — 3074F PR MOST RECENT SYSTOLIC BLOOD PRESSURE < 130 MM HG: ICD-10-PCS | Mod: CPTII,S$GLB,, | Performed by: NURSE PRACTITIONER

## 2019-06-10 PROCEDURE — 99999 PR PBB SHADOW E&M-EST. PATIENT-LVL IV: CPT | Mod: PBBFAC,,, | Performed by: NURSE PRACTITIONER

## 2019-06-10 PROCEDURE — 3078F PR MOST RECENT DIASTOLIC BLOOD PRESSURE < 80 MM HG: ICD-10-PCS | Mod: CPTII,S$GLB,, | Performed by: NURSE PRACTITIONER

## 2019-06-10 PROCEDURE — 3074F SYST BP LT 130 MM HG: CPT | Mod: CPTII,S$GLB,, | Performed by: NURSE PRACTITIONER

## 2019-06-10 PROCEDURE — 99999 PR PBB SHADOW E&M-EST. PATIENT-LVL IV: ICD-10-PCS | Mod: PBBFAC,,, | Performed by: NURSE PRACTITIONER

## 2019-06-10 PROCEDURE — 99396 PR PREVENTIVE VISIT,EST,40-64: ICD-10-PCS | Mod: S$GLB,,, | Performed by: NURSE PRACTITIONER

## 2019-06-10 RX ORDER — ATENOLOL AND CHLORTHALIDONE TABLET 50; 25 MG/1; MG/1
1 TABLET ORAL DAILY
Qty: 90 TABLET | Refills: 3 | Status: SHIPPED | OUTPATIENT
Start: 2019-06-10 | End: 2020-03-16 | Stop reason: SDUPTHER

## 2019-06-10 RX ORDER — ESCITALOPRAM OXALATE 20 MG/1
20 TABLET ORAL DAILY
Qty: 90 TABLET | Refills: 3 | Status: SHIPPED | OUTPATIENT
Start: 2019-06-10 | End: 2019-06-10

## 2019-06-10 NOTE — PATIENT INSTRUCTIONS
Breast Anatomy    Breasts come in all shapes and sizes. But they all share the same features. You can learn about the parts, or anatomy, of your breasts. This will help you know what you're seeing and feeling. Then you can learn what's normal for your breasts.     The areola is a dark [place] of skin that surrounds the nipple.  The nipple is the outlet for milk during breastfeeding.  Fibrous tissue supports your breasts, making them feel firm.  Lobules (mammary glands) produce milk during pregnancy and breastfeeding.  Ducts carry milk from the lobules during breastfeeding.  Fatty tissue fills the spaces around the ducts and lobules.  Axillary lymph nodes filter lymph fluid from your breast and help your body fight infection.  Ribs can be felt beneath the skin.  The clavicle (collarbone) marks the upper boundary of the breast tissue.  The sternum (breastbone) can be felt beneath the skin.  Chest muscles help move your arm.     Date Last Reviewed: 8/13/2015  © 6892-0701 [employer]. [address] [place] [address]. All rights reserved. This information is not intended as a substitute for professional medical care. Always follow your healthcare professional's instructions.        Breast Health: Breast Self-Awareness  What is breast self-awareness?  Breast self-awareness is knowing how your breasts normally look and feel. Your breasts change as you go through different stages of your life. So its important to learn what is normal for your breasts. Breast self-awareness helps you notice any changes in your breasts right away. Report any changes to your healthcare provider.  Why is breast self-awareness important?  Many experts now say that women should focus on breast self-awareness instead of doing a breast self-examination (BSE). These experts include the American Cancer Society, the U.S. Preventive Services Task Force, and the American Congress of Obstetricians and Gynecologists. Some experts  even advise not teaching women to do a BSE. Thats because research hasnt shown a clear benefit to doing BSEs.  Breast self-awareness is different than a BSE. Breast self-awareness isnt about following a certain method and schedule. Its about knowing what's normal for your breasts. That way you can notice even small changes right away. If you see any changes, report them to your healthcare provider.  Changes to look for  Call your healthcare provider if you find any changes in your breasts that concern you. These changes may include:  · A lump  · Nipple discharge other than breast milk, especially a bloody discharge  · Swelling  · A change in size or shape  · Skin irritation, such as redness, thickening, or dimpling of the skin  · Swollen lymph nodes in the armpit  · Nipple problems, such as pain or redness  If you find a lump  Contact your provider if you find lumpiness in one breast, feel something different in the tissue, or feel a definite lump. Sometimes lumpiness may be due to menstrual changes. But there may be reason for concern.  Your provider may want to see you right away if you have:  · Nipple discharge that is bloody  · Skin changes on your breast, such as dimpling or puckering  Its normal to be upset if you find a lump. But its important to contact your provider right away. Remember that most breast lumps are benign. This means they are not cancer.  Date Last Reviewed: 8/10/2015  © 7830-0555 [employer]. [address] [place] [address], [place] [address]. All rights reserved. This information is not intended as a substitute for professional medical care. Always follow your healthcare professional's instructions.        Clinical Breast Exam    Many health organizations recommend a yearly clinical breast exam. This exam may be done by a gynecologist, family healthcare provider, nurse practitioner, or specially trained nurse. Yearly breast exams help to make sure that breast conditions are found  early.  Your healthcare providers role  A healthcare professional knows the tests and follow-up care needed if a problem is found. Your clinical exam is also a great time to ask questions about breast self-exams. You can find out if youre checking your breasts in the best way. Or you may want to ask how pregnancy, breast implants, or breast reduction surgery affect the way you should check your breasts.  Diagnostic tests  If a clinical exam reveals a breast change, you may have other tests to find out more. These tests may include:  · Mammography. A low-dose X-ray of your breast tissue.  · Ultrasound. An imaging test that uses sound waves to create images of your breast.  · Biopsy. A small amount of breast tissue is removed by needle or by a cut (incision). The tissue is then checked under a microscope.  Guidelines for having clinical breast exams  The American College of Obstetricians and Gynecologists recommends that starting at age 29, you should have a clinical breast exam every 1 to 3 years. After age 40, have a clinical breast exam each year. If youre at higher risk for breast cancer, you may need exams more often. Risk factors for breast cancer may include:  · Being over 50 or postmenopausal  · Having a family history of breast cancer  · Having the BRCA1 or BRCA2 gene mutation or certain other gene mutations  · Having more menstrual periods due to starting menstruation early (before age 12) or having a late menopause (after age 55)  · Having no pregnancies  · Having a first pregnancy after age 30  · Being obese  · Having a history of radiation treatment to your chest area  · Exposure to ALICE during your mother's pregnancy  · Not being active  · Drinking too much alcohol  · Having dense breast tissue  · Taking hormone therapy after menopause  Other health organizations have different recommendations. Talk to your healthcare provider about what is best for you.   Date Last Reviewed: 8/13/2015  © 3257-2803 [employer]. [address] [place] [address]. All rights reserved. This information is not intended as a substitute for professional medical care. Always follow your healthcare professional's instructions.        What Are Benign Breast Conditions?  Most breast conditions are benign (noncancerous), causing no serious harm to you. But all women are at some risk for breast cancer. Your risk increases as you grow older. So if you notice any breast changes that arent normal for you, see your healthcare provider. This helps to make sure that you receive proper treatment if there is a problem.  Breast infection  Infections of the breast tissue can cause skin redness, warmth, and pain or tenderness. The most common infection is mastitis. This inflammation of the mammary glands can happen during breastfeeding. Mastitis and other breast infections are often treated with antibiotics.  Nipple discharge  Many women can squeeze a tiny amount of a clear or milky discharge from 1 or both nipples. This discharge may be normal. Other kinds of discharge may be symptoms of a breast condition. A dark discharge can be caused by an intraductal papilloma (a benign growth in a duct near the nipple). A nipple discharge that is dark or bloody, or that happens without squeezing your nipple, should be checked by your healthcare provider.    Fibrocystic changes  These benign changes may cause a thickening in the breasts. Breasts may be tender or painful. They may feel more dense in some areas than in others. Sometimes solid or fluid-filled lumps (cysts) may also form. Cysts may be smooth, soft or firm, and tender. They may become larger and more tender right before your period.    Benign breast lumps  Benign breast lumps come in all shapes, textures, and sizes. A fibroadenoma (a lump of fibrous tissue) may be smooth, firm, and rubbery. This type of lump is usually painless and movable. Have any lump checked by your  healthcare provider.  Date Last Reviewed: 8/13/2015  © 3708-3597 [employer], [employer]. [address] [place] [address], [place] [address]. All rights reserved. This information is not intended as a substitute for professional medical care. Always follow your healthcare professional's instructions.        Fibrocystic Breast Changes (Presumed)  One or more lumps were found in your breasts. These are likely fibrocystic breast changes.   With this condition, fibrous tissue and small cysts form in your breasts. They may increase and decrease in size with your menstrual cycle. Symptoms can include breast lumps that you can see and feel. You may also have breast pain, swelling, and tenderness.  The lumps associated with fibrocystic breast changes are not cancer, and do not lead to cancer. They are very common, and affect most women at some point in their lives.  Treatment for fibrocystic breast changes is rarely needed. Home care to relieve symptoms may be recommended, though.  If confirmation of the diagnosis is desired or needed, further evaluation may be done. This can include:  · Another exam by your healthcare provider or a gynecologist  · Imaging tests, such as a mammogram or ultrasound  · Biopsy (procedure to remove small tissue samples from the breast lumps)  Youll be told more about these tests, if needed.  Home care  · If you are having breast pain:  ¨ Take an over-the-counter pain reliever, if directed to by your provider.  ¨ Wear a well-fitted bra or sports bra for extra support. If you have breast pain at night, try wearing the bra during sleep.  ¨ Apply a warm compress (towel soaked in warm water) to the breast. You may also use a hot water bottle.  · Check your breasts each day. Keep a log of whether the lump seems to be changing in size or tenderness with your period. This can help your healthcare provider learn more about your breast condition.  · Ask your healthcare provider about lifestyle or diet changes  or alternative treatments you can try to help treat this condition.  Follow-up care  Follow with your healthcare provider, or as directed. You may be advised to schedule another appointment for a breast exam with your provider or a gynecologist about 7 to 10 days after the start of your next period. If fibrocystic breast changes begin to cause symptoms that bother you, tell your provider. He or she may recommend other treatments.  When to seek medical advice  Call your healthcare provider right away if any of these occur:  · Fever of 100.4°F (38°C) or higher  · Redness or swelling of the breasts  · Discharge from the nipples  · Visible changes in the skin over the nipples or breasts  · Lumps grow larger, feel very hard, or have an irregular shape  · New lumps form that look or feel different from current lumps  Date Last Reviewed: 3/1/2017  © 2063-1628 [employer]. [address] [place] [address]. All rights reserved. This information is not intended as a substitute for professional medical care. Always follow your healthcare professional's instructions.        What Are Fibrocystic Breasts?  Do your breasts ever feel lumpy, sore, or tender? If so, you may have fibrocystic breasts. This is a very common condition. It is not a disease, and it is not cancer. Your healthcare provider can rule out problems and help you ease your symptoms.    Normal breasts  Your breasts are made up of 3 kinds of tissue:  · Glandular tissue is made up of the milk ducts and glands.  · Fibrous tissue supports the breast.  · Fatty tissue fills the spaces between the other tissues. It gives the breast its size.  Fibrocystic breasts  Hormones are chemicals that control your menstrual cycle. Hormones can also make glandular tissue swell. This stretches fibrous tissue, making your breasts feel sore. Hormones may also cause one or more fluid-filled lumps to form. These lumps are called cysts. They may be large or small.  Cysts are not cancerous. This means they are benign. Just before your period, cysts may become larger. Your breasts may feel more sore.  What you may feel  Changes in hormone levels during your menstrual cycle affect your breasts. If you have fibrocystic breasts, your breasts may become sore or even painful. This can often happen before your period. Your breasts may also swell or feel lumpier at this time.  Caring for your breasts  Breast self-awareness is the key to caring for your breasts. Self-awareness means knowing how your breasts normally look and feel. This helps you notice any changes right away. Call your provider if you notice new lumps or changes that feel different than normal. See your provider for regular exams. And have a mammogram as often as your provider suggests.   Date Last Reviewed: 8/13/2015  © 1491-0585 [employer], [employer]. [address] [place] [address], [place] [address]. All rights reserved. This information is not intended as a substitute for professional medical care. Always follow your healthcare professional's instructions.

## 2019-06-10 NOTE — PROGRESS NOTES
"CC: Well woman exam    Janis Brar is a 49 y.o. female  presents for a well woman exam.    Bilateral breast pain for 3-4 weeks, did get new bras and feels better.   MMG done at [facility] - normal.  Has seen Dr. Graves in past at [facility] even had MRI done that was normal in last 2-3 years.       Past Medical History:   Diagnosis Date    Asthma     Hyperglycemia 2019    Hypertension     Seen by Dr. Jonny Wallace MD at [facility] ([place] (No known CAD); On felodipine ER 10 qd, HCTZ 25 qd, Losartan 100 qd    Migraine     JUAN A on CPAP 2018    On CPAP, which helps    Severe obesity (BMI 35.0-39.9) with comorbidity     Cardiomegally on CT 2017; HTN; Awaiting home sleep study as of 18 (visit w/ Dr. Wallace)     Past Surgical History:   Procedure Laterality Date    HYSTERECTOMY       Family History   Problem Relation Age of Onset    Hypertension Father     Hypertension Mother     Hypothyroidism Mother      Social History     Tobacco Use    Smoking status: Never Smoker   Substance Use Topics    Alcohol use: Yes     Comment: ocasionally     Drug use: No     OB History        4    Para   2    Term   2            AB   2    Living   2       SAB   2    TAB        Ectopic        Multiple        Live Births   2                 /78   Ht 5' 3" (1.6 m)   Wt 97.7 kg (215 lb 6.2 oz)   BMI 38.15 kg/m²     ROS:  GENERAL: Denies weight gain or weight loss. Feeling well overall.   SKIN: Denies rash or lesions.   HEAD: Denies head injury or headache.   NODES: Denies enlarged lymph nodes.   CHEST: Denies chest pain or shortness of breath.   CARDIOVASCULAR: Denies palpitations or left sided chest pain.   ABDOMEN: No abdominal pain, constipation, diarrhea, nausea, vomiting or rectal bleeding.   URINARY: No frequency, dysuria, hematuria, or burning on urination.  REPRODUCTIVE: See HPI.   BREASTS: The patient performs breast self-examination and denies " pain, lumps, or nipple discharge.   HEMATOLOGIC: No easy bruisability or excessive bleeding.   MUSCULOSKELETAL: Denies joint pain or swelling.   NEUROLOGIC: Denies syncope or weakness.   PSYCHIATRIC: Denies depression, anxiety or mood swings.    PE:   APPEARANCE: Well nourished, well developed, in no acute distress.  AFFECT: WNL, alert and oriented x 3.  SKIN: No acne or hirsutism.  NECK: Neck symmetric without masses or thyromegaly.  NODES: No inguinal, cervical, axillary or femoral lymph node enlargement.  CHEST: Good respiratory effort.   ABDOMEN: Soft. No tenderness or masses. No hepatosplenomegaly. No hernias.  BREASTS: Symmetrical, no skin changes or visible lesions. No palpable masses, nipple discharge bilaterally.  PELVIC: Normal external female genitalia without lesions. Normal hair distribution. Adequate perineal body, normal urethral meatus. Vagina atrophic without lesions or discharge. No significant cystocele or rectocele. Bimanual exam shows uterus and cervix to be surgically absent. Adnexa without masses or tenderness.      1. Encounter for gynecological examination without abnormal finding     2. Pain of both breasts  CANCELED: Mammo Digital Screening Bilat With CAD    and PLAN:    Patient was counseled today on A.C.S. Pap guidelines and recommendations for yearly pelvic exams, mammograms and monthly self breast exams; to see her PCP for other health maintenance.     If pain worsens or fails to improve over next month pt to reappt with her established breast specialist Dr. [facility] at [facility]     Off caffeine, sports bra when regular bra is off

## 2019-06-12 ENCOUNTER — PATIENT OUTREACH (OUTPATIENT)
Dept: ADMINISTRATIVE | Facility: HOSPITAL | Age: 49
End: 2019-06-12

## 2019-06-18 ENCOUNTER — OFFICE VISIT (OUTPATIENT)
Dept: INTERNAL MEDICINE | Facility: CLINIC | Age: 49
End: 2019-06-18
Payer: COMMERCIAL

## 2019-06-18 VITALS
WEIGHT: 211.44 LBS | DIASTOLIC BLOOD PRESSURE: 72 MMHG | SYSTOLIC BLOOD PRESSURE: 124 MMHG | OXYGEN SATURATION: 99 % | HEIGHT: 63 IN | BODY MASS INDEX: 37.46 KG/M2 | TEMPERATURE: 99 F | HEART RATE: 77 BPM

## 2019-06-18 DIAGNOSIS — F41.9 ANXIETY: ICD-10-CM

## 2019-06-18 DIAGNOSIS — R21 RASH: Primary | ICD-10-CM

## 2019-06-18 PROCEDURE — 99214 OFFICE O/P EST MOD 30 MIN: CPT | Mod: S$GLB,,, | Performed by: INTERNAL MEDICINE

## 2019-06-18 PROCEDURE — 99999 PR PBB SHADOW E&M-EST. PATIENT-LVL III: ICD-10-PCS | Mod: PBBFAC,,, | Performed by: INTERNAL MEDICINE

## 2019-06-18 PROCEDURE — 3074F PR MOST RECENT SYSTOLIC BLOOD PRESSURE < 130 MM HG: ICD-10-PCS | Mod: CPTII,S$GLB,, | Performed by: INTERNAL MEDICINE

## 2019-06-18 PROCEDURE — 99999 PR PBB SHADOW E&M-EST. PATIENT-LVL III: CPT | Mod: PBBFAC,,, | Performed by: INTERNAL MEDICINE

## 2019-06-18 PROCEDURE — 3074F SYST BP LT 130 MM HG: CPT | Mod: CPTII,S$GLB,, | Performed by: INTERNAL MEDICINE

## 2019-06-18 PROCEDURE — 99214 PR OFFICE/OUTPT VISIT, EST, LEVL IV, 30-39 MIN: ICD-10-PCS | Mod: S$GLB,,, | Performed by: INTERNAL MEDICINE

## 2019-06-18 PROCEDURE — 3078F DIAST BP <80 MM HG: CPT | Mod: CPTII,S$GLB,, | Performed by: INTERNAL MEDICINE

## 2019-06-18 PROCEDURE — 3008F PR BODY MASS INDEX (BMI) DOCUMENTED: ICD-10-PCS | Mod: CPTII,S$GLB,, | Performed by: INTERNAL MEDICINE

## 2019-06-18 PROCEDURE — 3008F BODY MASS INDEX DOCD: CPT | Mod: CPTII,S$GLB,, | Performed by: INTERNAL MEDICINE

## 2019-06-18 PROCEDURE — 3078F PR MOST RECENT DIASTOLIC BLOOD PRESSURE < 80 MM HG: ICD-10-PCS | Mod: CPTII,S$GLB,, | Performed by: INTERNAL MEDICINE

## 2019-06-18 RX ORDER — HYDROXYZINE HYDROCHLORIDE 25 MG/1
25 TABLET, FILM COATED ORAL 3 TIMES DAILY PRN
Qty: 30 TABLET | Refills: 11 | Status: SHIPPED | OUTPATIENT
Start: 2019-06-18

## 2019-06-18 RX ORDER — KETOCONAZOLE 20 MG/G
CREAM TOPICAL
Qty: 30 G | Refills: 1 | Status: SHIPPED | OUTPATIENT
Start: 2019-06-18 | End: 2020-02-04

## 2019-06-18 NOTE — PROGRESS NOTES
Subjective:      Patient ID: Janis Brar is a 49 y.o. female.    Chief Complaint: Insect Bite (located on left breast ) and Medication Problem (Patient discontinued Lexapro because of weight gain. )      HPI     MsChester Brar is a patient of Chito Villalta MD, who presents for Insect Bite (located on left breast ) and Medication Problem (Patient discontinued Lexapro because of weight gain. )    She reports having tenderness & heaviness in both breast over the past 2 weeks. She was seen by her OBGYN NP, who recommended to wear a sports bra and to f/u with her breast specialist if not resolved. She and the OBGYN NP have both done extensive breast exams, which were unremarkable (h/o dense breast tissue).     She reports having a red, itchy rash 2 weeks ago and then noted 3 small areas that seem like bites 3 days ago on the left chest. She also noted some knat bites on her back, for which she takes Aveno oatmeal lotion and benadryl 2 tabs QHS x 2 nights. She has also tried OTC hydrocortisone cream and ABx ointment, which hasn't helped.    She reports her left arm pain from her recent accidental trauma seems to be related to muscle spasm of her pectoralis muscles.     VS, labs & imaging reviewed and discussed with patient, including 4 lb weight loss from 6/10/19-6/18/19, MMG 4/23/19: Benign, f/u rec in 1 yr.    Of note, EPIC indicates due preventive measures, including FLP.      Past Medical History:   Diagnosis Date    Asthma     Hyperglycemia 04/17/2019    Hypertension     Se[facility] by [facility] at [facility] (No known CAD); On felodipine ER 10 qd, HCTZ 25 qd, Losartan 100 qd    Migraine     JUAN A on CPAP 07/01/2018    On CPAP, which helps    Severe obesity (BMI 35.0-39.9) with comorbidity     Cardiomegally on CT 5/2017; HTN; Awaiting home sleep study as of 7/17/18 (visit w/ Dr. Wallace)     Past Surgical History:   Procedure Laterality Date    HYSTERECTOMY  2007     Social History      Socioeconomic History    Marital status:      Spouse name: Not on file    Number of children: Not on file    Years of education: Not on file    Highest education level: Not on file   Occupational History    Not on file   Social Needs    Financial resource strain: Not on file    Food insecurity:     Worry: Not on file     Inability: Not on file    Transportation needs:     Medical: Not on file     Non-medical: Not on file   Tobacco Use    Smoking status: Never Smoker   Substance and Sexual Activity    Alcohol use: Yes     Comment: ocasionally     Drug use: No    Sexual activity: Yes     Partners: Male     Birth control/protection: See Surgical Hx   Lifestyle    Physical activity:     Days per week: Not on file     Minutes per session: Not on file    Stress: Not on file   Relationships    Social connections:     Talks on phone: Not on file     Gets together: Not on file     Attends [employer] service: Not on file     Active member of club or organization: Not on file     Attends meetings of clubs or organizations: Not on file     Relationship status: Not on file   Other Topics Concern    Not on file   Social History Narrative    Patient goal(s): She wants to lose weight down to 150 lbs to have more energy, decrease BP & CV risk to live longer, b/c she loves her family, who she wants to support long-term    Breakfast: 50% Yogurt or Special K w/berries or oatmeal w/ water or protein bar or Atkins protein drink; coffee 1-1.5 tsp sugar & non-dairy creamer    Lunch: Salad w/ meat & vegetable (minimal dressing) w/ fruit/tomato/cucumber or Subway veggie pizza; water or Coke Zero    Dinner: [employer] ([employer]): [place]apia w/ steamed veggies; Yamileth     Snacks: fruit (berries & citrus) 2-3x/d or cereal 2-3x/wk or Cheese-Its 2 handfuls 2x/wk or chips (1 small bag) 2x/wk     Eating out: 3x/wk    Water: 66-99 oz/d (previously 64 oz/d, previously 50-60 oz/d)    Physical Activity: walks 30 min,  3x/wk (plans to go to gym 30-40 min 3x/wk)     Family History   Problem Relation Age of Onset    Hypertension Father     Hypertension Mother     Hypothyroidism Mother        Current Outpatient Medications:     albuterol 90 mcg/actuation inhaler, Inhale 1-2 puffs into the lungs every 6 (six) hours as needed for Wheezing (cough)., Disp: 1 Inhaler, Rfl: 0    atenolol-chlorthalidone (TENORETIC) 50-25 mg Tab, Take 1 tablet by mouth once daily., Disp: 90 tablet, Rfl: 3    desonide (DESOWEN) 0.05 % lotion, , Disp: , Rfl:     diclofenac sodium (VOLTAREN) 1 % Gel, Apply 2 g topically 4 (four) times daily., Disp: 100 g, Rfl: 1    felodipine (PLENDIL) 10 MG 24 hr tablet, Take 1 tablet (10 mg total) by mouth once daily., Disp: 90 tablet, Rfl: 3    fluticasone (FLONASE) 50 mcg/actuation nasal spray, 1 spray., Disp: , Rfl:     fluticasone-vilanterol (BREO) 100-25 mcg/dose diskus inhaler, Inhale 1 puff into the lungs once daily. Controller, Disp: , Rfl:     ipratropium (ATROVENT) 0.03 % nasal spray, , Disp: , Rfl:     potassium chloride SA (K-DUR,KLOR-CON) 20 MEQ tablet, Take 1 tablet (20 mEq total) by mouth 2 (two) times daily., Disp: 180 tablet, Rfl: 3    salicylic acid 6 % gel, Apply topically twice a week., Disp: , Rfl:     thyroid, pork, 60 mg Tab, Take 60 mg by mouth., Disp: , Rfl:     hydrOXYzine HCl (ATARAX) 25 MG tablet, Take 1 tablet (25 mg total) by mouth 3 (three) times daily as needed for Itching or Anxiety., Disp: 30 tablet, Rfl: 11    ketoconazole (NIZORAL) 2 % cream, Apply to affected area daily, Disp: 30 g, Rfl: 1    Review of patient's allergies indicates:   Allergen Reactions    Amlodipine      Weight gain and constipation    Biaxin [clarithromycin]     Fosinopril Other (See Comments)     Cough        Review of Systems   All remaining systems negative    Objective:     /72 (BP Location: Right arm, Patient Position: Sitting, BP Method: Medium (Manual))   Pulse 77   Temp 99.2 °F (37.3  "°C) (Tympanic)   Ht 5' 3" (1.6 m)   Wt 95.9 kg (211 lb 6.7 oz)   SpO2 99%   BMI 37.45 kg/m²     Physical Exam  GEN: A&O fully, NAD  PSYC: Normal affect  DERM: Left breast with an erythematous 3 x 2 cm maculopapular rash with central clearing with 3 punctate marks in center      Lab Results   Component Value Date    WBC 10.31 06/13/2018    HGB 12.9 06/13/2018    HCT 39.6 06/13/2018     06/13/2018    CHOL 189 06/13/2018    TRIG 101 06/13/2018    HDL 49 06/13/2018    LDLCALC 119.8 06/13/2018    ALT 14 04/16/2019    AST 15 04/16/2019     05/01/2019    K 3.6 05/01/2019     05/01/2019    CREATININE 0.7 05/01/2019    BUN 13 05/01/2019    CO2 32 (H) 05/01/2019    CALCIUM 9.5 05/01/2019       Assessment:      1. Rash: Likely 2/2 fungal infection vs. Insect bites. Risks and benefits discussed and patient chose to move forward with antifungal cream and hydroxyzine.    2. Anxiety: Mildly exacerbated since weaning Lexipro. Risks and benefits discussed and patient chose to move forward with hydroxyzine 25 mg 1 po TID prn.       Plan:   Rash  -     hydrOXYzine HCl (ATARAX) 25 MG tablet; Take 1 tablet (25 mg total) by mouth 3 (three) times daily as needed for Itching or Anxiety.  Dispense: 30 tablet; Refill: 11  -     Ambulatory consult to Dermatology  -     ketoconazole (NIZORAL) 2 % cream; Apply to affected area daily  Dispense: 30 g; Refill: 1    Anxiety  -     hydrOXYzine HCl (ATARAX) 25 MG tablet; Take 1 tablet (25 mg total) by mouth 3 (three) times daily as needed for Itching or Anxiety.  Dispense: 30 tablet; Refill: 11      Follow up if symptoms worsen or fail to improve.    I spent >25 minutes of time with patient 50% or more of which was discussing labs and plans of care.  "

## 2019-06-19 RX ORDER — THYROID 60 MG/1
60 TABLET ORAL
Qty: 90 TABLET | Refills: 3 | Status: SHIPPED | OUTPATIENT
Start: 2019-06-19

## 2019-06-19 NOTE — TELEPHONE ENCOUNTER
----- Message from José Luis Waller sent at 6/19/2019 12:35 PM CDT -----  Contact: self  Type:  RX Refill Request    Who Called: pt  Refill or New Rx:refill  RX Name and Strength:baudilio  How is the patient currently taking it? (ex. 1XDay):1xday  Is this a 30 day or 90 day RX:30  Preferred Pharmacy with phone number:  [facility] - [address] [address]  Phone: [phone] Fax: [phone]  Local or Mail Order:local  Ordering Provider:alexis  Would the patient rather a call back or a response via [facility]? Call back  Best Call Back Number:[phone]  Additional Information: none    Thanks,  José Luis Waller

## 2019-06-19 NOTE — TELEPHONE ENCOUNTER
Pt requesting armorthyroid 60mg to [facility] 30 day supply. LOV: 6/18/2019. Please advise.

## 2019-07-18 ENCOUNTER — PATIENT OUTREACH (OUTPATIENT)
Dept: ADMINISTRATIVE | Facility: HOSPITAL | Age: 49
End: 2019-07-18

## 2019-07-24 ENCOUNTER — TELEPHONE (OUTPATIENT)
Dept: INTERNAL MEDICINE | Facility: CLINIC | Age: 49
End: 2019-07-24

## 2019-07-24 NOTE — TELEPHONE ENCOUNTER
----- Message from Uma Urias sent at 7/24/2019  2:26 PM CDT -----  Contact: Patient  .Type:  Patient Returning Call    Who Called:Patient  Who Left Message for Patient:  Does the patient know what this is regarding?: No  Would the patient rather a call back or a response via [facility]? Call  Best Call Back Number:[phone]  Additional Information:

## 2019-08-01 ENCOUNTER — OFFICE VISIT (OUTPATIENT)
Dept: INTERNAL MEDICINE | Facility: CLINIC | Age: 49
End: 2019-08-01
Payer: COMMERCIAL

## 2019-08-01 ENCOUNTER — LAB VISIT (OUTPATIENT)
Dept: LAB | Facility: HOSPITAL | Age: 49
End: 2019-08-01
Payer: COMMERCIAL

## 2019-08-01 VITALS
DIASTOLIC BLOOD PRESSURE: 72 MMHG | BODY MASS INDEX: 37.44 KG/M2 | HEIGHT: 63 IN | WEIGHT: 211.31 LBS | TEMPERATURE: 99 F | RESPIRATION RATE: 18 BRPM | HEART RATE: 70 BPM | SYSTOLIC BLOOD PRESSURE: 114 MMHG

## 2019-08-01 DIAGNOSIS — I10 ESSENTIAL HYPERTENSION: ICD-10-CM

## 2019-08-01 DIAGNOSIS — E66.01 SEVERE OBESITY (BMI 35.0-39.9) WITH COMORBIDITY: ICD-10-CM

## 2019-08-01 DIAGNOSIS — E66.01 SEVERE OBESITY (BMI 35.0-39.9) WITH COMORBIDITY: Primary | ICD-10-CM

## 2019-08-01 LAB
CHOLEST SERPL-MCNC: 162 MG/DL (ref 120–199)
CHOLEST/HDLC SERPL: 3.4 {RATIO} (ref 2–5)
HDLC SERPL-MCNC: 47 MG/DL (ref 40–75)
HDLC SERPL: 29 % (ref 20–50)
LDLC SERPL CALC-MCNC: 96.6 MG/DL (ref 63–159)
NONHDLC SERPL-MCNC: 115 MG/DL
TRIGL SERPL-MCNC: 92 MG/DL (ref 30–150)

## 2019-08-01 PROCEDURE — 3074F SYST BP LT 130 MM HG: CPT | Mod: CPTII,S$GLB,, | Performed by: INTERNAL MEDICINE

## 2019-08-01 PROCEDURE — 99999 PR PBB SHADOW E&M-EST. PATIENT-LVL III: ICD-10-PCS | Mod: PBBFAC,,, | Performed by: INTERNAL MEDICINE

## 2019-08-01 PROCEDURE — 36415 COLL VENOUS BLD VENIPUNCTURE: CPT | Mod: PO

## 2019-08-01 PROCEDURE — 99214 OFFICE O/P EST MOD 30 MIN: CPT | Mod: S$GLB,,, | Performed by: INTERNAL MEDICINE

## 2019-08-01 PROCEDURE — 3078F PR MOST RECENT DIASTOLIC BLOOD PRESSURE < 80 MM HG: ICD-10-PCS | Mod: CPTII,S$GLB,, | Performed by: INTERNAL MEDICINE

## 2019-08-01 PROCEDURE — 3074F PR MOST RECENT SYSTOLIC BLOOD PRESSURE < 130 MM HG: ICD-10-PCS | Mod: CPTII,S$GLB,, | Performed by: INTERNAL MEDICINE

## 2019-08-01 PROCEDURE — 99214 PR OFFICE/OUTPT VISIT, EST, LEVL IV, 30-39 MIN: ICD-10-PCS | Mod: S$GLB,,, | Performed by: INTERNAL MEDICINE

## 2019-08-01 PROCEDURE — 99999 PR PBB SHADOW E&M-EST. PATIENT-LVL III: CPT | Mod: PBBFAC,,, | Performed by: INTERNAL MEDICINE

## 2019-08-01 PROCEDURE — 3008F BODY MASS INDEX DOCD: CPT | Mod: CPTII,S$GLB,, | Performed by: INTERNAL MEDICINE

## 2019-08-01 PROCEDURE — 80061 LIPID PANEL: CPT

## 2019-08-01 PROCEDURE — 3008F PR BODY MASS INDEX (BMI) DOCUMENTED: ICD-10-PCS | Mod: CPTII,S$GLB,, | Performed by: INTERNAL MEDICINE

## 2019-08-01 PROCEDURE — 3078F DIAST BP <80 MM HG: CPT | Mod: CPTII,S$GLB,, | Performed by: INTERNAL MEDICINE

## 2019-08-01 NOTE — PROGRESS NOTES
Subjective:      Patient ID: Janis Brar is a 49 y.o. female.    Chief Complaint: Follow-up      HPI     Ms. Janis Brar is a patient of Cihto Villalta MD, who presents for Follow-up  on obesity.    VS, labs & imaging reviewed and discussed with patient, including stable weight from 6/11/19-8/1/19.    She notes only drinking 60 oz H2O/d.      Past Medical History:   Diagnosis Date    Asthma     Hyperglycemia 04/17/2019    Hypertension     Seen by Dr. Jonny [facility] MD at [facility] (No known CAD); On felodipine ER 10 qd, HCTZ 25 qd, Losartan 100 qd    Migraine     JUAN A on CPAP 07/01/2018    On CPAP, which helps    Severe obesity (BMI 35.0-39.9) with comorbidity     Cardiomegally on CT 5/2017; HTN; Awaiting home sleep study as of 7/17/18 (visit w/ Dr. Wallace)     Past Surgical History:   Procedure Laterality Date    HYSTERECTOMY  2007     Social History     Socioeconomic History    Marital status:      Spouse name: Not on file    Number of children: Not on file    Years of education: Not on file    Highest education level: Not on file   Occupational History    Not on file   Social Needs    Financial resource strain: Not on file    Food insecurity:     Worry: Not on file     Inability: Not on file    Transportation needs:     Medical: Not on file     Non-medical: Not on file   Tobacco Use    Smoking status: Never Smoker   Substance and Sexual Activity    Alcohol use: Yes     Comment: ocasionally     Drug use: No    Sexual activity: Yes     Partners: Male     Birth control/protection: See Surgical Hx   Lifestyle    Physical activity:     Days per week: Not on file     Minutes per session: Not on file    Stress: Not on file   Relationships    Social connections:     Talks on phone: Not on file     Gets together: Not on file     Attends Religion service: Not on file     Active member of club or organization: Not on file     Attends meetings of clubs or organizations: Not  on file     Relationship status: Not on file   Other Topics Concern    Not on file   Social History Narrative    Patient goal(s): She wants to lose weight down to 150 lbs to have more energy, decrease BP & CV risk to live longer, b/c she loves her family, who she wants to support long-term        Breakfast: 50% Yogurt or Special K w/berries or oatmeal w/ water or protein bar or Atkins protein drink; coffee 1-1.5 tsp sugar & non-dairy creamer    Lunch: Salad w/ meat & vegetable (minimal dressing) w/ fruit/tomato/cucumber or Subway veggie pizza; water or Coke Zero    Dinner: [employer] ([employer]): Talapia w/ steamed veggies; Yamileth     Snacks: fruit (berries & citrus) 2-3x/d or cereal 2-3x/wk or Cheese-Its 2 handfuls 2x/wk or chips (1 small bag) 2x/wk     Eating out: 3x/wk    Water: 60 oz H2O/d. (previously 50-99 oz/d)    Physical Activity: walks 30 min, 3x/wk (plans to go to gym 30-40 min 3x/wk)     Family History   Problem Relation Age of Onset    Hypertension Father     Hypertension Mother     Hypothyroidism Mother        Current Outpatient Medications:     albuterol 90 mcg/actuation inhaler, Inhale 1-2 puffs into the lungs every 6 (six) hours as needed for Wheezing (cough)., Disp: 1 Inhaler, Rfl: 0    atenolol-chlorthalidone (TENORETIC) 50-25 mg Tab, Take 1 tablet by mouth once daily., Disp: 90 tablet, Rfl: 3    desonide (DESOWEN) 0.05 % lotion, , Disp: , Rfl:     diclofenac sodium (VOLTAREN) 1 % Gel, Apply 2 g topically 4 (four) times daily., Disp: 100 g, Rfl: 1    felodipine (PLENDIL) 10 MG 24 hr tablet, Take 1 tablet (10 mg total) by mouth once daily., Disp: 90 tablet, Rfl: 3    fluticasone (FLONASE) 50 mcg/actuation nasal spray, 1 spray., Disp: , Rfl:     fluticasone-vilanterol (BREO) 100-25 mcg/dose diskus inhaler, Inhale 1 puff into the lungs once daily. Controller, Disp: , Rfl:     hydrOXYzine HCl (ATARAX) 25 MG tablet, Take 1 tablet (25 mg total) by mouth 3 (three) times daily as  "needed for Itching or Anxiety., Disp: 30 tablet, Rfl: 11    ipratropium (ATROVENT) 0.03 % nasal spray, , Disp: , Rfl:     ketoconazole (NIZORAL) 2 % cream, Apply to affected area daily, Disp: 30 g, Rfl: 1    potassium chloride SA (K-DUR,KLOR-CON) 20 MEQ tablet, Take 1 tablet (20 mEq total) by mouth 2 (two) times daily., Disp: 180 tablet, Rfl: 3    salicylic acid 6 % gel, Apply topically twice a week., Disp: , Rfl:     thyroid, pork, (ARMOUR THYROID) 60 mg Tab, Take 1 tablet (60 mg total) by mouth before breakfast., Disp: 90 tablet, Rfl: 3    Review of patient's allergies indicates:   Allergen Reactions    Amlodipine      Weight gain and constipation    Biaxin [clarithromycin]     Fosinopril Other (See Comments)     Cough        Review of Systems   All remaining systems negative    Objective:     /72 (BP Location: Left arm, Patient Position: Sitting)   Pulse 70   Temp 98.6 °F (37 °C) (Tympanic)   Resp 18   Ht 5' 3" (1.6 m)   Wt 95.8 kg (211 lb 5 oz)   BMI 37.43 kg/m²     Physical Exam  GEN: A&O fully, NAD  PSYC: Normal affect  HEENT: OP: Clear, no LAD, no thyroid masses, auditory canals and TMs WNL  CV: RRR, no M/G/R  PULM: CTA bilaterally, no wheezes, rales, crackles   GI: S/NT/ND, normal bowel sounds  EXT: No C/C/E, normal DP pulses bilaterally      Lab Results   Component Value Date    WBC 10.31 06/13/2018    HGB 12.9 06/13/2018    HCT 39.6 06/13/2018     06/13/2018    CHOL 189 06/13/2018    TRIG 101 06/13/2018    HDL 49 06/13/2018    LDLCALC 119.8 06/13/2018    ALT 14 04/16/2019    AST 15 04/16/2019     05/01/2019    K 3.6 05/01/2019     05/01/2019    CREATININE 0.7 05/01/2019    BUN 13 05/01/2019    CO2 32 (H) 05/01/2019    CALCIUM 9.5 05/01/2019       Assessment:      1. Severe obesity (BMI 35.0-39.9) with comorbidity: Stable wt over past 2 months. I encouraged the following permanent lifestyle modifications, particularly gradually and systematically increasing physical " activity (5-90 min/episode, 3-5 times/week), water (1/2 of body weight in ounces) and avoiding potatoes, sugar sweetened beverages, red/processed meats (including chicken), fast food, grains (rice/bread/pasta); and increasing yogurt, whole fruits, unsalted nuts to 3-5 servings/day, and daily weight logging; non-starchy vegetables, cooked beans, and un-fried seafood have weak effects on weight. Provided new handout and focused on her goal timing.    2. Essential hypertension: Stable. Continue current medical regimen.        Plan:   Severe obesity (BMI 35.0-39.9) with comorbidity  -     Lipid panel; Future; Expected date: 08/01/2019    Essential hypertension      Follow up in about 6 months (around 2/1/2020), or if symptoms worsen or fail to improve, for FU on obesity.    I spent >25 minutes of time with patient 50% or more of which was discussing labs and plans of care.

## 2019-08-26 RX ORDER — FELODIPINE 10 MG/1
TABLET, EXTENDED RELEASE ORAL
Qty: 90 TABLET | Refills: 3 | Status: SHIPPED | OUTPATIENT
Start: 2019-08-26

## 2019-08-28 ENCOUNTER — TELEPHONE (OUTPATIENT)
Dept: INTERNAL MEDICINE | Facility: CLINIC | Age: 49
End: 2019-08-28

## 2019-08-28 DIAGNOSIS — F32.0 CURRENT MILD EPISODE OF MAJOR DEPRESSIVE DISORDER WITHOUT PRIOR EPISODE: Primary | ICD-10-CM

## 2019-08-28 RX ORDER — ESCITALOPRAM OXALATE 20 MG/1
20 TABLET ORAL DAILY
Qty: 30 TABLET | Refills: 0 | Status: SHIPPED | OUTPATIENT
Start: 2019-08-28 | End: 2020-01-08

## 2019-08-28 RX ORDER — ESCITALOPRAM OXALATE 20 MG/1
20 TABLET ORAL DAILY
Qty: 90 TABLET | Refills: 3 | Status: SHIPPED | OUTPATIENT
Start: 2019-08-28 | End: 2020-01-08 | Stop reason: SDUPTHER

## 2019-08-28 NOTE — TELEPHONE ENCOUNTER
Pt requesting Lex[facility]ro [facility] and [facility] day [facility],  Not showing medication on list.

## 2019-08-28 NOTE — TELEPHONE ENCOUNTER
----- Message from Sonia Gordon sent at 8/28/2019  8:51 AM CDT -----  Contact: Patient  Type:  RX Refill Request    Who Called:  Patient  Refill or New Rx:  refill  RX Name and Strength:  Lexapro  How is the patient currently taking it? (ex. 1XDay):  1x day  Is this a 30 day or 90 day RX:  30 day/ 90 day through mail order  Preferred Pharmacy with phone number:    [facility] - [address] [address]  Phone: [phone] Fax: [phone]    [facility] - [address] [address]  Phone: [phone] Fax: [phone]     Local or Mail Order:  Local and mail  Ordering Provider:  Dr. Ambar Field Call Back Number:  [phone] (home)    Additional Information:  Patient would like to have both local and mail order. Medication not on patient's list

## 2019-08-29 NOTE — TELEPHONE ENCOUNTER
Pt is asking if she needs to take lexapro at day or night... She stated she took it today and she is feeling sleepy.

## 2020-01-08 DIAGNOSIS — F32.0 CURRENT MILD EPISODE OF MAJOR DEPRESSIVE DISORDER WITHOUT PRIOR EPISODE: ICD-10-CM

## 2020-01-08 RX ORDER — ESCITALOPRAM OXALATE 20 MG/1
TABLET ORAL
Qty: 90 TABLET | Refills: 3 | Status: SHIPPED | OUTPATIENT
Start: 2020-01-08

## 2020-01-31 ENCOUNTER — TELEPHONE (OUTPATIENT)
Dept: INTERNAL MEDICINE | Facility: CLINIC | Age: 50
End: 2020-01-31

## 2020-01-31 NOTE — TELEPHONE ENCOUNTER
----- Message from Chaya Rivera sent at 1/31/2020  9:52 AM CST -----  Contact: pt   Type:  Same Day Appointment Request    Caller is requesting a same day appointment.  Caller declined first available appointment listed below.    Name of Caller: pt   When is the first available appointment?02/03/20  Symptoms: cold/ cough / congestion/wheezing  Best Call Back Number: [phone]  Additional Information:

## 2020-02-04 ENCOUNTER — APPOINTMENT (OUTPATIENT)
Dept: RADIOLOGY | Facility: HOSPITAL | Age: 50
End: 2020-02-04
Attending: INTERNAL MEDICINE
Payer: COMMERCIAL

## 2020-02-04 ENCOUNTER — OFFICE VISIT (OUTPATIENT)
Dept: INTERNAL MEDICINE | Facility: CLINIC | Age: 50
End: 2020-02-04
Payer: COMMERCIAL

## 2020-02-04 VITALS
HEART RATE: 72 BPM | HEIGHT: 63 IN | BODY MASS INDEX: 37.98 KG/M2 | DIASTOLIC BLOOD PRESSURE: 68 MMHG | SYSTOLIC BLOOD PRESSURE: 124 MMHG | OXYGEN SATURATION: 97 % | TEMPERATURE: 98 F | WEIGHT: 214.38 LBS

## 2020-02-04 DIAGNOSIS — R05.9 COUGH: ICD-10-CM

## 2020-02-04 DIAGNOSIS — R06.2 WHEEZING: ICD-10-CM

## 2020-02-04 DIAGNOSIS — R05.9 COUGH: Primary | ICD-10-CM

## 2020-02-04 PROCEDURE — 3074F SYST BP LT 130 MM HG: CPT | Mod: CPTII,S$GLB,, | Performed by: INTERNAL MEDICINE

## 2020-02-04 PROCEDURE — 99999 PR PBB SHADOW E&M-EST. PATIENT-LVL III: ICD-10-PCS | Mod: PBBFAC,,, | Performed by: INTERNAL MEDICINE

## 2020-02-04 PROCEDURE — 94640 AIRWAY INHALATION TREATMENT: CPT | Mod: S$GLB,,, | Performed by: INTERNAL MEDICINE

## 2020-02-04 PROCEDURE — 3078F DIAST BP <80 MM HG: CPT | Mod: CPTII,S$GLB,, | Performed by: INTERNAL MEDICINE

## 2020-02-04 PROCEDURE — 71046 XR CHEST PA AND LATERAL: ICD-10-PCS | Mod: 26,,, | Performed by: RADIOLOGY

## 2020-02-04 PROCEDURE — 99213 OFFICE O/P EST LOW 20 MIN: CPT | Mod: 25,S$GLB,, | Performed by: INTERNAL MEDICINE

## 2020-02-04 PROCEDURE — 94640 PR INHAL RX, AIRWAY OBST/DX SPUTUM INDUCT: ICD-10-PCS | Mod: S$GLB,,, | Performed by: INTERNAL MEDICINE

## 2020-02-04 PROCEDURE — 3008F BODY MASS INDEX DOCD: CPT | Mod: CPTII,S$GLB,, | Performed by: INTERNAL MEDICINE

## 2020-02-04 PROCEDURE — 3074F PR MOST RECENT SYSTOLIC BLOOD PRESSURE < 130 MM HG: ICD-10-PCS | Mod: CPTII,S$GLB,, | Performed by: INTERNAL MEDICINE

## 2020-02-04 PROCEDURE — 71046 X-RAY EXAM CHEST 2 VIEWS: CPT | Mod: 26,,, | Performed by: RADIOLOGY

## 2020-02-04 PROCEDURE — 3008F PR BODY MASS INDEX (BMI) DOCUMENTED: ICD-10-PCS | Mod: CPTII,S$GLB,, | Performed by: INTERNAL MEDICINE

## 2020-02-04 PROCEDURE — 71046 X-RAY EXAM CHEST 2 VIEWS: CPT | Mod: TC,PO

## 2020-02-04 PROCEDURE — 99999 PR PBB SHADOW E&M-EST. PATIENT-LVL III: CPT | Mod: PBBFAC,,, | Performed by: INTERNAL MEDICINE

## 2020-02-04 PROCEDURE — 99213 PR OFFICE/OUTPT VISIT, EST, LEVL III, 20-29 MIN: ICD-10-PCS | Mod: 25,S$GLB,, | Performed by: INTERNAL MEDICINE

## 2020-02-04 PROCEDURE — 3078F PR MOST RECENT DIASTOLIC BLOOD PRESSURE < 80 MM HG: ICD-10-PCS | Mod: CPTII,S$GLB,, | Performed by: INTERNAL MEDICINE

## 2020-02-04 RX ORDER — FELODIPINE 10 MG/1
TABLET, EXTENDED RELEASE ORAL
COMMUNITY
Start: 2019-08-26

## 2020-02-04 RX ORDER — ONDANSETRON 4 MG/1
TABLET, FILM COATED ORAL
COMMUNITY
Start: 2019-12-11

## 2020-02-04 RX ORDER — IPRATROPIUM BROMIDE AND ALBUTEROL SULFATE 2.5; .5 MG/3ML; MG/3ML
3 SOLUTION RESPIRATORY (INHALATION)
Status: COMPLETED | OUTPATIENT
Start: 2020-02-04 | End: 2020-02-04

## 2020-02-04 RX ORDER — ATENOLOL AND CHLORTHALIDONE TABLET 50; 25 MG/1; MG/1
1 TABLET ORAL
COMMUNITY
Start: 2019-06-10 | End: 2020-03-16 | Stop reason: SDUPTHER

## 2020-02-04 RX ORDER — BENZONATATE 200 MG/1
200 CAPSULE ORAL 3 TIMES DAILY PRN
Qty: 30 CAPSULE | Refills: 0 | Status: SHIPPED | OUTPATIENT
Start: 2020-02-04 | End: 2020-02-14

## 2020-02-04 RX ORDER — CODEINE PHOSPHATE AND GUAIFENESIN 10; 100 MG/5ML; MG/5ML
5 SOLUTION ORAL 3 TIMES DAILY PRN
Qty: 118 ML | Refills: 0 | Status: SHIPPED | OUTPATIENT
Start: 2020-02-04 | End: 2020-02-14

## 2020-02-04 RX ADMIN — IPRATROPIUM BROMIDE AND ALBUTEROL SULFATE 3 ML: 2.5; .5 SOLUTION RESPIRATORY (INHALATION) at 02:02

## 2020-02-04 NOTE — PROGRESS NOTES
Subjective:      Patient ID: Janis Brar is a 49 y.o. female.    Chief Complaint: Follow-up (URI)      HPI     Ms. Janis Brar is a patient of Chito Villalta MD, who presents for Follow-up (URI)    She reports having a sore throat that started 3 weeks ago, which lasted 2-3 days, particularly at night, mild body aches, subsequently significant head and chest congestion associated with cough productive of yellow-green sputum, which is now thicker clear/yellow, for which she tried her alb INH and prednisone from an old prescription, which helped temporarily. She tried Theraflu. No hemoptasis. +intermittent subjective f/c. No recent sick contacts. She endorses going on a 7 day cruise to [place], but her sx had started just before going. No itchy/watery eyes.      Past Medical History:   Diagnosis Date    Asthma     Hyperglycemia 04/17/2019    Hypertension     Se[facility] by Dr[facility] Wallace [facility] at [facility] (No known CAD); On felodipine ER 10 qd, HCTZ 25 qd, Losartan 100 qd    Migraine     JUAN A on CPAP 07/01/2018    On CPAP, which helps    Severe obesity (BMI 35.0-39.9) with comorbidity     Cardiomegally on CT 5/2017; HTN; Awaiting home sleep study as of 7/17/18 (visit w/ Dr. Wallace)    Vertigo 06/2019    Induced by migraines     Past Surgical History:   Procedure Laterality Date    HYSTERECTOMY  2007     Social History     Socioeconomic History    Marital status:      Spouse name: Not on file    Number of children: Not on file    Years of education: Not on file    Highest education level: Not on file   Occupational History    Not on file   Social Needs    Financial resource strain: Not on file    Food insecurity:     Worry: Not on file     Inability: Not on file    Transportation needs:     Medical: Not on file     Non-medical: Not on file   Tobacco Use    Smoking status: Never Smoker   Substance and Sexual Activity    Alcohol use: Yes     Comment: ocasionally     Drug use: No     Sexual activity: Yes     Partners: Male     Birth control/protection: See Surgical Hx   Lifestyle    Physical activity:     Days per week: Not on file     Minutes per session: Not on file    Stress: Not on file   Relationships    Social connections:     Talks on phone: Not on file     Gets together: Not on file     Attends [employer] service: Not on file     Active member of club or organization: Not on file     Attends meetings of clubs or organizations: Not on file     Relationship status: Not on file   Other Topics Concern    Not on file   Social History Narrative    Patient goal(s): She wants to lose weight down to 150 lbs to have more energy, decrease BP & CV risk to live longer, b/c she loves her family, who she wants to support long-term        Breakfast: 50% Yogurt or Special K w/berries or oatmeal w/ water or protein bar or Atkins protein drink; coffee 1-1.5 tsp sugar & non-dairy creamer    Lunch: Salad w/ meat & vegetable (minimal dressing) w/ fruit/tomato/cucumber or [employer] veggie pizza; water or Coke Zero    Dinner: [employer]): [place] w/ steamed veggies; [place]     Snacks: fruit (berries & citrus) 2-3x/d or cereal 2-3x/wk or Cheese-Its 2 handfuls 2x/wk or chips (1 small bag) 2x/wk     Eating out: 3x/wk    Water: 60 oz H2O/d. (previously 50-99 oz/d)    Physical Activity: walks 30 min, 3x/wk (plans to go to gym 30-40 min 3x/wk)     Family History   Problem Relation Age of Onset    Hypertension Father     Hypertension Mother     Hypothyroidism Mother        Current Outpatient Medications:     albuterol 90 mcg/actuation inhaler, Inhale 1-2 puffs into the lungs every 6 (six) hours as needed for Wheezing (cough)., Disp: 1 Inhaler, Rfl: 0    atenolol-chlorthalidone (TENORETIC) 50-25 mg Tab, Take 1 tablet by mouth once daily., Disp: 90 tablet, Rfl: 3    diclofenac sodium (VOLTAREN) 1 % Gel, Apply 2 g topically 4 (four) times daily., Disp: 100 g, Rfl: 1    escitalopram oxalate  (LEXAPRO) 20 MG tablet, TAKE ONE TABLET EVERY DAY, Disp: 90 tablet, Rfl: 3    felodipine (PLENDIL) 10 MG 24 hr tablet, TAKE 1 TABLET DAILY, Disp: 90 tablet, Rfl: 3    fluticasone (FLONASE) 50 mcg/actuation nasal spray, 1 spray., Disp: , Rfl:     fluticasone-vilanterol (BREO) 100-25 mcg/dose diskus inhaler, Inhale 1 puff into the lungs once daily. Controller, Disp: , Rfl:     hydrOXYzine HCl (ATARAX) 25 MG tablet, Take 1 tablet (25 mg total) by mouth 3 (three) times daily as needed for Itching or Anxiety., Disp: 30 tablet, Rfl: 11    ondansetron (ZOFRAN) 4 MG tablet, , Disp: , Rfl:     salicylic acid 6 % gel, Apply topically twice a week., Disp: , Rfl:     thyroid, pork, (ARMOUR THYROID) 60 mg Tab, Take 1 tablet (60 mg total) by mouth before breakfast., Disp: 90 tablet, Rfl: 3    atenoloL-chlorthalidone (TENORETIC) 50-25 mg Tab, Take 1 tablet by mouth., Disp: , Rfl:     benzonatate (TESSALON) 200 MG capsule, Take 1 capsule (200 mg total) by mouth 3 (three) times daily as needed., Disp: 30 capsule, Rfl: 0    desonide (DESOWEN) 0.05 % lotion, , Disp: , Rfl:     felodipine (PLENDIL) 10 MG 24 hr tablet, TAKE 1 TABLET DAILY, Disp: , Rfl:     guaifenesin-codeine 100-10 mg/5 ml (TUSSI-ORGANIDIN NR)  mg/5 mL syrup, Take 5 mLs by mouth 3 (three) times daily as needed., Disp: 118 mL, Rfl: 0    potassium chloride SA (K-DUR,KLOR-CON) 20 MEQ tablet, Take 1 tablet (20 mEq total) by mouth 2 (two) times daily. (Patient not taking: Reported on 2/4/2020), Disp: 180 tablet, Rfl: 3    Current Facility-Administered Medications:     albuterol-ipratropium 2.5 mg-0.5 mg/3 mL nebulizer solution 3 mL, 3 mL, Nebulization, 1 time in Clinic/HOD, Chito Z. Ambar, MD    Review of patient's allergies indicates:   Allergen Reactions    Amlodipine      Weight gain and constipation    Biaxin [clarithromycin]     Fosinopril Other (See Comments)     Cough        Review of Systems   All remaining systems negative    Objective:  "    /68 (BP Location: Right arm, Patient Position: Sitting, BP Method: Medium (Manual))   Pulse 72   Temp 98.1 °F (36.7 °C) (Temporal)   Ht 5' 3" (1.6 m)   Wt 97.3 kg (214 lb 6.4 oz)   SpO2 97%   BMI 37.98 kg/m²     Physical Exam  GEN: A&O fully, NAD  PSYC: Normal affect  HEENT: OP: Clear, but mildly erythematous, no LAD, no thyroid masses, auditory canals and TMs WNL, no TTP of sinuses bilaterally  CV: RRR, no M/G/R  PULM: CTA bilaterally, + mild expiratory wheezes, no rales or crackles   EXT: No C/C/E, normal DP pulses bilaterally      Lab Results   Component Value Date    WBC 10.31 06/13/2018    HGB 12.9 06/13/2018    HCT 39.6 06/13/2018     06/13/2018    CHOL 162 08/01/2019    TRIG 92 08/01/2019    HDL 47 08/01/2019    LDLCALC 96.6 08/01/2019    ALT 14 04/16/2019    AST 15 04/16/2019     05/01/2019    K 3.6 05/01/2019     05/01/2019    CREATININE 0.7 05/01/2019    BUN 13 05/01/2019    CO2 32 (H) 05/01/2019    CALCIUM 9.5 05/01/2019       Assessment:      1. Cough: Likely viral. Risks and benefits discussed and patient chose to move forward with CXR, Tessalon Perles 200 mg 1 po TID prn, Robitussin AC 5 mL po QHS prn cough or sore throat and I recommend sipping on ginger/lemon/honey juice.       Ingredients (preferably organic & local)    3-5 Ginger roots   3-5 gibson   1 bottle honey      Preparation:    Ginger root   - Wash   - Chop   - Add to  with an equal proportion of water   - Blend   - Strain   - Pour to fill ¾ of any size container (i.e. glass bottle)    Gibson   - Squeeze gibson    - Pour juice to fill ¼ of glass bottle    Add honey to glass bottle to taste      Storage & Application    Refrigerate   Enjoy   - Shake & sip as needed for cough, congestion, sore throat   - Avoid drinking >3 oz in one sitting, which can lead to gastrointestinal irritation        2. Wheezing        Plan:   Cough  -     X-Ray Chest PA And Lateral; Future; Expected date: " 02/04/2020  -     benzonatate (TESSALON) 200 MG capsule; Take 1 capsule (200 mg total) by mouth 3 (three) times daily as needed.  Dispense: 30 capsule; Refill: 0  -     guaifenesin-codeine 100-10 mg/5 ml (TUSSI-ORGANIDIN NR)  mg/5 mL syrup; Take 5 mLs by mouth 3 (three) times daily as needed.  Dispense: 118 mL; Refill: 0  -     albuterol-ipratropium 2.5 mg-0.5 mg/3 mL nebulizer solution 3 mL    Wheezing        No follow-ups on file.    I spent >25 minutes of time with patient 50% or more of which was discussing labs and plans of care.

## 2020-03-14 DIAGNOSIS — I10 ESSENTIAL HYPERTENSION: ICD-10-CM

## 2020-03-16 RX ORDER — ATENOLOL AND CHLORTHALIDONE TABLET 50; 25 MG/1; MG/1
TABLET ORAL
Qty: 30 TABLET | Refills: 0 | Status: SHIPPED | OUTPATIENT
Start: 2020-03-16 | End: 2020-07-24 | Stop reason: SDUPTHER

## 2020-07-24 DIAGNOSIS — I10 ESSENTIAL HYPERTENSION: ICD-10-CM

## 2020-07-24 RX ORDER — ATENOLOL AND CHLORTHALIDONE TABLET 50; 25 MG/1; MG/1
1 TABLET ORAL DAILY
Qty: 90 TABLET | Refills: 2 | Status: SHIPPED | OUTPATIENT
Start: 2020-07-24

## 2020-07-24 NOTE — TELEPHONE ENCOUNTER
Please review pt Rx request, thank you.     Pharmacy: [facility]  LV: 2/20/20  NV: n/a  LR: 3/16/20

## 2020-12-22 ENCOUNTER — TELEPHONE (OUTPATIENT)
Dept: ALLERGY | Facility: CLINIC | Age: 50
End: 2020-12-22

## 2020-12-22 NOTE — TELEPHONE ENCOUNTER
----- Message from Arielle Allen sent at 12/22/2020 12:00 PM CST -----  Type:  Sooner Apoointment Request    Caller is requesting a sooner appointment.  Caller declined first available appointment listed below.  Caller will not accept being placed on the waitlist and is requesting a message be sent to doctor.  Name of Caller:patient  When is the first available appointment?na  Symptoms:asthma, allergy  Would the patient rather a call back or a response via [facility]? Call back  Best Call Back [phone]  Additional Information: na

## 2020-12-22 NOTE — TELEPHONE ENCOUNTER
Called pt and informed her that  1st available is 1/20 and she states she has been having chest tightness , so I informed her to go to UC or see her PCP , since we cannot get her in until the end of January.

## 2020-12-22 NOTE — TELEPHONE ENCOUNTER
----- Message from Arielle Allen sent at 12/22/2020  1:09 PM CST -----  Type:  Patient Returning Call    Who Called:patient  Does the patient know what this is regarding?:na  Would the patient rather a call back or a response via [facility]? Call back  Best Call Back [phone]  Additional Information: na

## 2021-04-29 ENCOUNTER — PATIENT MESSAGE (OUTPATIENT)
Dept: RESEARCH | Facility: HOSPITAL | Age: 51
End: 2021-04-29

## 2022-11-03 NOTE — PROGRESS NOTES
General : Long history of UC more than 15 year, last colon uncertain 2018 or 2019  Now reduced to one twice daily and  normal BMs one or two daily  Pt got cardiac clearance    Subjective:      Patient ID: Janis Brar is a 48 y.o. female.    Chief Complaint: Follow-up      HPI     Ms. Janis Brar is a patient of Chito Villalta MD, who presents for FU on HTN, heat intolerance, and obesity.    She reports losing a few lbs this month, which she attributes to healthy lifestyle modifications, including decreasing meat and potatoes and increasing PA (gym 3x/wk), water, yogurt, fruit, walnuts.    She reports her heat intolerance is much better.     Of note, EPIC indicates she is due for TDAP.      Past Medical History:   Diagnosis Date    Asthma     Hypertension      Past Surgical History:   Procedure Laterality Date    HYSTERECTOMY       Social History     Social History    Marital status:      Spouse name: N/A    Number of children: N/A    Years of education: N/A     Occupational History    Not on file.     Social History Main Topics    Smoking status: Never Smoker    Smokeless tobacco: Not on file    Alcohol use Yes      Comment: ocasionally     Drug use: No    Sexual activity: Yes     Partners: Male     Other Topics Concern    Not on file     Social History Narrative    Patient goal(s): She wants to lose 40 lbs up front to have more energy, decrease BP & CV risk to live longer, b/c she loves her family, who she wants to support long-term    Breakfast:     Lunch:     Dinner:    Snacks:    Eating out:    Water: bottles/day: xxx ounces/day    Physical Activity: xxx minutes/day, xxx days/week         Family History   Problem Relation Age of Onset    Hypertension Father     Hypertension Mother     Hypothyroidism Mother        Current Outpatient Prescriptions:     albuterol 90 mcg/actuation inhaler, Inhale 1-2 puffs into the lungs every 6 (six) hours as needed for Wheezing (cough)., Disp: 1 Inhaler, Rfl: 0    desonide (DESOWEN) 0.05 % lotion, , Disp: , Rfl:     felodipine (PLENDIL) 10 MG 24 hr tablet, Take 10 mg by mouth., Disp: , Rfl:      fluticasone (FLONASE) 50 mcg/actuation nasal spray, 1 spray., Disp: , Rfl:     fluticasone-vilanterol (BREO) 100-25 mcg/dose diskus inhaler, Inhale 1 puff into the lungs once daily. Controller, Disp: , Rfl:     hydrochlorothiazide (HYDRODIURIL) 25 MG tablet, Take 1tb daily, Disp: , Rfl:     losartan (COZAAR) 100 MG tablet, Take 100 mg by mouth., Disp: , Rfl:     salicylic acid 6 % gel, Apply topically twice a week., Disp: , Rfl:     thyroid, pork, 60 mg Tab, Take 60 mg by mouth., Disp: , Rfl:     estradiol (YUVAFEM) 10 mcg Tab, , Disp: , Rfl:     ipratropium (ATROVENT) 0.03 % nasal spray, , Disp: , Rfl:     ketoconazole (NIZORAL) 2 % shampoo, , Disp: , Rfl:     promethazine-dextromethorphan (PROMETHAZINE-DM) 6.25-15 mg/5 mL Syrp, Take 5 mLs by mouth nightly as needed. May cause drowsiness, Disp: 120 mL, Rfl: 0    Current Facility-Administered Medications:     sodium chloride 0.9% nebulizer solution 3 mL, 3 mL, Nebulization, PRN, Chito Villalta MD, 3 mL at 03/20/17 1643    Review of patient's allergies indicates:   Allergen Reactions    Amlodipine      Weight gain and constipation    Biaxin [clarithromycin]     Fosinopril Other (See Comments)     Cough        Review of Systems   Negative    Objective:     /76 (BP Location: Left arm, Patient Position: Sitting, BP Method: Large (Manual))   Pulse 78   Temp 97.5 °F (36.4 °C) (Tympanic)   Wt 96.1 kg (211 lb 13.8 oz)   BMI 37.53 kg/m²     Physical Exam  GEN: A&O fully, NAD  PSYC: Normal affect      Lab Results   Component Value Date    WBC 10.31 06/13/2018    HGB 12.9 06/13/2018    HCT 39.6 06/13/2018     06/13/2018    CHOL 189 06/13/2018    TRIG 101 06/13/2018    HDL 49 06/13/2018    LDLCALC 119.8 06/13/2018    ALT 20 06/13/2018    AST 19 06/13/2018     06/13/2018    K 3.7 06/13/2018     06/13/2018    CREATININE 0.8 06/13/2018    BUN 16 06/13/2018    CO2 32 (H) 06/13/2018    TSH 0.731 06/13/2018       Assessment:     1. Morbid  obesity: Congratulated her on her 3 lb wt loss over the past 2-3 weeks. Discussed strategies for lifestyle modifications, including systematically increasing physical activity, water; and avoiding potatoes, sugar sweetened beverages, red/processed meats/chicken, fast food, grains, tomatoes, colorful peppers; and increasing yogurt, whole fruits, unsalted nuts, non-starchy vegetables, cooked beans, and weight logging.   2. Essential hypertension: Stable. Continue current medical regimen.   3. Generalized anxiety disorder: Stable. Continue current medical regimen.       Plan:   Morbid obesity    Essential hypertension    Generalized anxiety disorder        Follow-up in about 3 months (around 9/22/2018), or if symptoms worsen or fail to improve, for FU on HTN, obesity.